# Patient Record
Sex: FEMALE | Race: ASIAN | NOT HISPANIC OR LATINO | URBAN - METROPOLITAN AREA
[De-identification: names, ages, dates, MRNs, and addresses within clinical notes are randomized per-mention and may not be internally consistent; named-entity substitution may affect disease eponyms.]

---

## 2019-03-23 ENCOUNTER — EMERGENCY (EMERGENCY)
Facility: HOSPITAL | Age: 24
LOS: 1 days | Discharge: HOME | End: 2019-03-23
Admitting: EMERGENCY MEDICINE
Payer: COMMERCIAL

## 2019-03-23 VITALS
TEMPERATURE: 99 F | RESPIRATION RATE: 16 BRPM | HEART RATE: 120 BPM | OXYGEN SATURATION: 100 % | SYSTOLIC BLOOD PRESSURE: 150 MMHG | DIASTOLIC BLOOD PRESSURE: 84 MMHG

## 2019-03-23 VITALS
DIASTOLIC BLOOD PRESSURE: 62 MMHG | SYSTOLIC BLOOD PRESSURE: 127 MMHG | HEART RATE: 113 BPM | TEMPERATURE: 99 F | OXYGEN SATURATION: 100 % | RESPIRATION RATE: 18 BRPM

## 2019-03-23 DIAGNOSIS — Z98.890 OTHER SPECIFIED POSTPROCEDURAL STATES: Chronic | ICD-10-CM

## 2019-03-23 LAB
ALBUMIN SERPL ELPH-MCNC: 4.6 G/DL — SIGNIFICANT CHANGE UP (ref 3.3–5)
ALP SERPL-CCNC: 68 U/L — SIGNIFICANT CHANGE UP (ref 40–120)
ALT FLD-CCNC: 21 U/L — SIGNIFICANT CHANGE UP (ref 4–33)
ANION GAP SERPL CALC-SCNC: 14 MMO/L — SIGNIFICANT CHANGE UP (ref 7–14)
AST SERPL-CCNC: 25 U/L — SIGNIFICANT CHANGE UP (ref 4–32)
BASE EXCESS BLDV CALC-SCNC: -0.8 MMOL/L — SIGNIFICANT CHANGE UP
BASOPHILS # BLD AUTO: 0.04 K/UL — SIGNIFICANT CHANGE UP (ref 0–0.2)
BASOPHILS NFR BLD AUTO: 0.5 % — SIGNIFICANT CHANGE UP (ref 0–2)
BILIRUB SERPL-MCNC: 0.4 MG/DL — SIGNIFICANT CHANGE UP (ref 0.2–1.2)
BLOOD GAS VENOUS - CREATININE: 0.54 MG/DL — SIGNIFICANT CHANGE UP (ref 0.5–1.3)
BUN SERPL-MCNC: 8 MG/DL — SIGNIFICANT CHANGE UP (ref 7–23)
CALCIUM SERPL-MCNC: 9.7 MG/DL — SIGNIFICANT CHANGE UP (ref 8.4–10.5)
CHLORIDE BLDV-SCNC: 108 MMOL/L — SIGNIFICANT CHANGE UP (ref 96–108)
CHLORIDE SERPL-SCNC: 105 MMOL/L — SIGNIFICANT CHANGE UP (ref 98–107)
CO2 SERPL-SCNC: 20 MMOL/L — LOW (ref 22–31)
CREAT SERPL-MCNC: 0.68 MG/DL — SIGNIFICANT CHANGE UP (ref 0.5–1.3)
EOSINOPHIL # BLD AUTO: 0.2 K/UL — SIGNIFICANT CHANGE UP (ref 0–0.5)
EOSINOPHIL NFR BLD AUTO: 2.7 % — SIGNIFICANT CHANGE UP (ref 0–6)
GAS PNL BLDV: 138 MMOL/L — SIGNIFICANT CHANGE UP (ref 136–146)
GLUCOSE BLDV-MCNC: 91 — SIGNIFICANT CHANGE UP (ref 70–99)
GLUCOSE SERPL-MCNC: 83 MG/DL — SIGNIFICANT CHANGE UP (ref 70–99)
HCO3 BLDV-SCNC: 23 MMOL/L — SIGNIFICANT CHANGE UP (ref 20–27)
HCT VFR BLD CALC: 43.4 % — SIGNIFICANT CHANGE UP (ref 34.5–45)
HCT VFR BLDV CALC: 43.7 % — SIGNIFICANT CHANGE UP (ref 34.5–45)
HGB BLD-MCNC: 14.3 G/DL — SIGNIFICANT CHANGE UP (ref 11.5–15.5)
HGB BLDV-MCNC: 14.3 G/DL — SIGNIFICANT CHANGE UP (ref 11.5–15.5)
IMM GRANULOCYTES NFR BLD AUTO: 0.4 % — SIGNIFICANT CHANGE UP (ref 0–1.5)
LACTATE BLDV-MCNC: 1.6 MMOL/L — SIGNIFICANT CHANGE UP (ref 0.5–2)
LYMPHOCYTES # BLD AUTO: 0.8 K/UL — LOW (ref 1–3.3)
LYMPHOCYTES # BLD AUTO: 10.8 % — LOW (ref 13–44)
MCHC RBC-ENTMCNC: 29.7 PG — SIGNIFICANT CHANGE UP (ref 27–34)
MCHC RBC-ENTMCNC: 32.9 % — SIGNIFICANT CHANGE UP (ref 32–36)
MCV RBC AUTO: 90.2 FL — SIGNIFICANT CHANGE UP (ref 80–100)
MONOCYTES # BLD AUTO: 0.55 K/UL — SIGNIFICANT CHANGE UP (ref 0–0.9)
MONOCYTES NFR BLD AUTO: 7.4 % — SIGNIFICANT CHANGE UP (ref 2–14)
NEUTROPHILS # BLD AUTO: 5.78 K/UL — SIGNIFICANT CHANGE UP (ref 1.8–7.4)
NEUTROPHILS NFR BLD AUTO: 78.2 % — HIGH (ref 43–77)
NRBC # FLD: 0 K/UL — LOW (ref 25–125)
PCO2 BLDV: 39 MMHG — LOW (ref 41–51)
PH BLDV: 7.4 PH — SIGNIFICANT CHANGE UP (ref 7.32–7.43)
PLATELET # BLD AUTO: 292 K/UL — SIGNIFICANT CHANGE UP (ref 150–400)
PMV BLD: 11 FL — SIGNIFICANT CHANGE UP (ref 7–13)
PO2 BLDV: 45 MMHG — HIGH (ref 35–40)
POTASSIUM BLDV-SCNC: 4.5 MMOL/L — SIGNIFICANT CHANGE UP (ref 3.4–4.5)
POTASSIUM SERPL-MCNC: 4.1 MMOL/L — SIGNIFICANT CHANGE UP (ref 3.5–5.3)
POTASSIUM SERPL-SCNC: 4.1 MMOL/L — SIGNIFICANT CHANGE UP (ref 3.5–5.3)
PROT SERPL-MCNC: 7.8 G/DL — SIGNIFICANT CHANGE UP (ref 6–8.3)
RBC # BLD: 4.81 M/UL — SIGNIFICANT CHANGE UP (ref 3.8–5.2)
RBC # FLD: 12 % — SIGNIFICANT CHANGE UP (ref 10.3–14.5)
SAO2 % BLDV: 79.8 % — SIGNIFICANT CHANGE UP (ref 60–85)
SODIUM SERPL-SCNC: 139 MMOL/L — SIGNIFICANT CHANGE UP (ref 135–145)
TROPONIN T, HIGH SENSITIVITY: < 6 NG/L — SIGNIFICANT CHANGE UP (ref ?–14)
WBC # BLD: 7.4 K/UL — SIGNIFICANT CHANGE UP (ref 3.8–10.5)
WBC # FLD AUTO: 7.4 K/UL — SIGNIFICANT CHANGE UP (ref 3.8–10.5)

## 2019-03-23 PROCEDURE — 71046 X-RAY EXAM CHEST 2 VIEWS: CPT | Mod: 26

## 2019-03-23 PROCEDURE — 99284 EMERGENCY DEPT VISIT MOD MDM: CPT | Mod: 25

## 2019-03-23 PROCEDURE — 71275 CT ANGIOGRAPHY CHEST: CPT | Mod: 26

## 2019-03-23 PROCEDURE — 93010 ELECTROCARDIOGRAM REPORT: CPT

## 2019-03-23 RX ORDER — MAGNESIUM SULFATE 500 MG/ML
2 VIAL (ML) INJECTION ONCE
Qty: 0 | Refills: 0 | Status: COMPLETED | OUTPATIENT
Start: 2019-03-23 | End: 2019-03-23

## 2019-03-23 RX ORDER — KETOROLAC TROMETHAMINE 30 MG/ML
30 SYRINGE (ML) INJECTION ONCE
Qty: 0 | Refills: 0 | Status: DISCONTINUED | OUTPATIENT
Start: 2019-03-23 | End: 2019-03-23

## 2019-03-23 RX ORDER — ACETAMINOPHEN 500 MG
650 TABLET ORAL ONCE
Qty: 0 | Refills: 0 | Status: COMPLETED | OUTPATIENT
Start: 2019-03-23 | End: 2019-03-23

## 2019-03-23 RX ORDER — ACETAMINOPHEN 500 MG
975 TABLET ORAL ONCE
Qty: 0 | Refills: 0 | Status: DISCONTINUED | OUTPATIENT
Start: 2019-03-23 | End: 2019-03-23

## 2019-03-23 RX ORDER — IPRATROPIUM/ALBUTEROL SULFATE 18-103MCG
3 AEROSOL WITH ADAPTER (GRAM) INHALATION ONCE
Qty: 0 | Refills: 0 | Status: COMPLETED | OUTPATIENT
Start: 2019-03-23 | End: 2019-03-23

## 2019-03-23 RX ORDER — SODIUM CHLORIDE 9 MG/ML
2000 INJECTION INTRAMUSCULAR; INTRAVENOUS; SUBCUTANEOUS ONCE
Qty: 0 | Refills: 0 | Status: COMPLETED | OUTPATIENT
Start: 2019-03-23 | End: 2019-03-23

## 2019-03-23 RX ORDER — ALBUTEROL 90 UG/1
2 AEROSOL, METERED ORAL
Qty: 1 | Refills: 0
Start: 2019-03-23 | End: 2019-04-01

## 2019-03-23 RX ADMIN — Medication 3 MILLILITER(S): at 17:44

## 2019-03-23 RX ADMIN — Medication 3 MILLILITER(S): at 18:05

## 2019-03-23 RX ADMIN — Medication 50 GRAM(S): at 21:50

## 2019-03-23 RX ADMIN — SODIUM CHLORIDE 2000 MILLILITER(S): 9 INJECTION INTRAMUSCULAR; INTRAVENOUS; SUBCUTANEOUS at 17:35

## 2019-03-23 RX ADMIN — Medication 650 MILLIGRAM(S): at 17:34

## 2019-03-23 RX ADMIN — Medication 125 MILLIGRAM(S): at 21:50

## 2019-03-23 RX ADMIN — Medication 30 MILLIGRAM(S): at 17:34

## 2019-03-23 NOTE — ED ADULT TRIAGE NOTE - CHIEF COMPLAINT QUOTE
Patient c/o a cough since yesterday, feels sob.  "Tightness in chest every time I try to talk or breath" Tachy cardiac in triage, pain in her chest when she coughs.

## 2019-03-23 NOTE — ED ADULT NURSE NOTE - OBJECTIVE STATEMENT
Received pt. in room 2 alert and oriented x 4, presenting to the ER complaining of a cough. Pt. verbalized that she has no medical history. Pt. stated " I started coughing since yesterday and I am bringing up some greenish color mucus, every time I cough my chest hurts". Medicated as ordered labs sent, will continue to monitor.

## 2019-03-23 NOTE — ED PROVIDER NOTE - BREATH SOUNDS
Mild crackles primarily L base. Pt speaking in full sentences with mild associates SOB, no Stridor, tripod posture. accessory muscle use or cyanosis.

## 2019-03-23 NOTE — ED PROVIDER NOTE - OBJECTIVE STATEMENT
24 Y/O F no PMH C/O 2 days of non-productive cough 22 Y/O F no PMH C/O 2 days of cough with associated with SOB sharp CP and chest tightness. She states her co-worker was recently diagnosed with pneumonia, pt also admits to fever and intermittent chills. She denies any recent travel, leg swelling, surgery or birth control. She denies H/O lung issues,denies ABD PN N V D Dizz or any other acute symptoms or complaints.

## 2019-03-23 NOTE — ED PROVIDER NOTE - CARE PLAN
Principal Discharge DX:	Pneumonia Principal Discharge DX:	Pneumonia  Secondary Diagnosis:	Lung nodule

## 2019-03-23 NOTE — ED PROVIDER NOTE - NSFOLLOWUPINSTRUCTIONS_ED_ALL_ED_FT
Rest, drink plenty of fluids.  Advance activity as tolerated.  Continue all previously prescribed medications as directed.  Follow up with your primary care physician in 48-72 hours- bring copies of your results.  Return to the ER for worsening or persistent symptoms, and/or ANY NEW OR CONCERNING SYMPTOMS. If you have issues obtaining follow up, please call: [phone] to obtain a doctor or specialist who takes your insurance in your area.  You may call [phone] to make an appointment with the internal medicine clinic. Follow up with your primary doctor. Levaquin which is an antibiotic and prednisone which is a steroid to help with breathing have been sent to your pharmacy. Take these medications daily and use your albuterol inhaler as needed every 4-6 hours. Return to the ER for any fever chills nightsweats shortness of breath or any other new, worsening or persistent symptoms.  Advance activity as tolerated.  Continue all previously prescribed medications as directed.  Follow up with your primary care physician in 48-72 hours- bring copies of your results.  Return to the ER for worsening or persistent symptoms, and/or ANY NEW OR CONCERNING SYMPTOMS. If you have issues obtaining follow up, please call: [phone] to obtain a doctor or specialist who takes your insurance in your area.  You may call [phone] to make an appointment with the internal medicine clinic. Follow up with your primary doctor, you have a small lung nodule, discuss this with your doctor. You may need to have a repeat CT scan or see a pulmonologist. Call  if you need to see  pulmonologist and follow up with a cardiologist as you had chest pain and you have a somewhat abnormal EKG. You can also lucas  for a cardiologist appointment. Levaquin which is an antibiotic and prednisone which is a steroid to help with breathing have been sent to your pharmacy. Take these medications daily and use your albuterol inhaler as needed every 4-6 hours. Return to the ER for any fever chills nightsweats shortness of breath or any other new, worsening or persistent symptoms.  Advance activity as tolerated.  Continue all previously prescribed medications as directed.  Follow up with your primary care physician in 48-72 hours- bring copies of your results.  Return to the ER for worsening or persistent symptoms, and/or ANY NEW OR CONCERNING SYMPTOMS. If you have issues obtaining follow up, please call: [phone] to obtain a doctor or specialist who takes your insurance in your area.  You may call [phone] to make an appointment with the internal medicine clinic.

## 2019-03-23 NOTE — ED PROVIDER NOTE - PROGRESS NOTE DETAILS
RAMÍREZ Boyle; Pt states she fells much better, is no longer SOB, clinically pneumonia. Wheezing significantly reduced and pt able to speak in full sentences without any difficulty. Pt received first dose of levaquin in ED, will send to pharmacy in addition to prednisone and an inhaler. Pt has a + sick contact for pneumonia which is her co-worker. RAMÍREZ Boyle; Pt states she fells much better, is no longer SOB, clinically pneumonia. Wheezing significantly reduced and pt able to speak in full sentences without any difficulty. Pt received first dose of levaquin in ED, will send to pharmacy in addition to prednisone and an inhaler. Pt has a + sick contact for pneumonia which is her co-worker. HR normalized, now slightly tachy, pt received multiple nebs and steroids.

## 2019-03-23 NOTE — ED PROVIDER NOTE - CLINICAL SUMMARY MEDICAL DECISION MAKING FREE TEXT BOX
23 Y/O F no PMH with a skick contact with pneumonia presents with cough with associated cp, sob and occasional chills. Likely pneumonia, will give fluids, supportive tx and reassess. X ray ordered to eval and CT A ordered to R/O PE and to check for focal consolidations. Pt is stable at this time, no acute respiratory distress.

## 2019-03-25 PROBLEM — Z78.9 OTHER SPECIFIED HEALTH STATUS: Chronic | Status: ACTIVE | Noted: 2019-03-23

## 2019-03-26 PROBLEM — Z00.00 ENCOUNTER FOR PREVENTIVE HEALTH EXAMINATION: Status: ACTIVE | Noted: 2019-03-26

## 2019-04-02 ENCOUNTER — APPOINTMENT (OUTPATIENT)
Dept: PULMONOLOGY | Facility: CLINIC | Age: 24
End: 2019-04-02
Payer: COMMERCIAL

## 2019-04-02 VITALS
DIASTOLIC BLOOD PRESSURE: 73 MMHG | OXYGEN SATURATION: 100 % | SYSTOLIC BLOOD PRESSURE: 123 MMHG | BODY MASS INDEX: 35.68 KG/M2 | HEART RATE: 90 BPM | WEIGHT: 209 LBS | HEIGHT: 64 IN

## 2019-04-02 DIAGNOSIS — Z82.3 FAMILY HISTORY OF STROKE: ICD-10-CM

## 2019-04-02 PROCEDURE — 94060 EVALUATION OF WHEEZING: CPT | Mod: 26

## 2019-04-02 PROCEDURE — 94729 DIFFUSING CAPACITY: CPT | Mod: 26

## 2019-04-02 PROCEDURE — 99203 OFFICE O/P NEW LOW 30 MIN: CPT | Mod: 25

## 2019-04-02 PROCEDURE — 94727 GAS DIL/WSHOT DETER LNG VOL: CPT | Mod: 26

## 2019-04-02 NOTE — HISTORY OF PRESENT ILLNESS
[FreeTextEntry1] : Patient is a 23-year-old lady with history of recent hospitalization for "pneumonia". The patient was treated with Levaquin and prednisone. She was admitted on March 23 with left upper anterior chest pain. At that time which a CT was done. Chest CT revealed no pneumonia. There was no PE. The patient had one subcentimeter 4 mm lung nodule.\par Patient is a nonsmoker. There is no history of exposure to passive smoking.\par Patient was exercising normally and doing well prior to this episode.\par She works as a  in a [employer].\par The patient had mild chest discomfort prior to hospitalization which is completely resolved.\par The patient currently is on 10 mg of prednisone.

## 2019-04-02 NOTE — PHYSICAL EXAM
[General Appearance - Well Developed] : well developed [Normal Appearance] : normal appearance [Well Groomed] : well groomed [General Appearance - Well Nourished] : well nourished [No Deformities] : no deformities [General Appearance - In No Acute Distress] : no acute distress [Normal Conjunctiva] : the conjunctiva exhibited no abnormalities [Eyelids - No Xanthelasma] : the eyelids demonstrated no xanthelasmas [Normal Oropharynx] : normal oropharynx [Neck Appearance] : the appearance of the neck was normal [Neck Cervical Mass (___cm)] : no neck mass was observed [Jugular Venous Distention Increased] : there was no jugular-venous distention [Thyroid Diffuse Enlargement] : the thyroid was not enlarged [Thyroid Nodule] : there were no palpable thyroid nodules [Heart Rate And Rhythm] : heart rate and rhythm were normal [Heart Sounds] : normal S1 and S2 [Murmurs] : no murmurs present [Abdomen Soft] : soft [Abdomen Tenderness] : non-tender [Abdomen Mass (___ Cm)] : no abdominal mass palpated [Abnormal Walk] : normal gait [Gait - Sufficient For Exercise Testing] : the gait was sufficient for exercise testing [Nail Clubbing] : no clubbing of the fingernails [Cyanosis, Localized] : no localized cyanosis [Petechial Hemorrhages (___cm)] : no petechial hemorrhages [Skin Color & Pigmentation] : normal skin color and pigmentation [Skin Turgor] : normal skin turgor [Deep Tendon Reflexes (DTR)] : deep tendon reflexes were 2+ and symmetric [Sensation] : the sensory exam was normal to light touch and pinprick [No Focal Deficits] : no focal deficits [Oriented To Time, Place, And Person] : oriented to person, place, and time [Impaired Insight] : insight and judgment were intact [Affect] : the affect was normal [] : no respiratory distress [Respiration, Rhythm And Depth] : normal respiratory rhythm and effort [Exaggerated Use Of Accessory Muscles For Inspiration] : no accessory muscle use [Auscultation Breath Sounds / Voice Sounds] : lungs were clear to auscultation bilaterally [FreeTextEntry1] : Acanthosis nigricans,

## 2019-04-02 NOTE — DISCUSSION/SUMMARY
[FreeTextEntry1] : The patient has no clear-cut evidence of pneumonia. The etiology of her chest pain prior to hospitalization is not clear. The CT chest was nondiagnostic.\par The patient currently is doing well. She was advised to stop prednisone.\par The patient has clinical features of hyperinsulinemia. She was advised to cut down the carbohydrate intake. She was advised to increase exercise.\par She was advised to take foundational functional supplements.

## 2019-05-06 ENCOUNTER — APPOINTMENT (OUTPATIENT)
Dept: PULMONOLOGY | Facility: CLINIC | Age: 24
End: 2019-05-06

## 2019-09-25 ENCOUNTER — EMERGENCY (EMERGENCY)
Facility: HOSPITAL | Age: 24
LOS: 1 days | Discharge: HOME | End: 2019-09-25
Admitting: EMERGENCY MEDICINE
Payer: COMMERCIAL

## 2019-09-25 VITALS
SYSTOLIC BLOOD PRESSURE: 148 MMHG | RESPIRATION RATE: 16 BRPM | HEART RATE: 96 BPM | OXYGEN SATURATION: 100 % | DIASTOLIC BLOOD PRESSURE: 86 MMHG | TEMPERATURE: 99 F

## 2019-09-25 DIAGNOSIS — Z98.890 OTHER SPECIFIED POSTPROCEDURAL STATES: Chronic | ICD-10-CM

## 2019-09-25 LAB
BASOPHILS # BLD AUTO: 0.06 K/UL — SIGNIFICANT CHANGE UP (ref 0–0.2)
BASOPHILS NFR BLD AUTO: 0.5 % — SIGNIFICANT CHANGE UP (ref 0–2)
EOSINOPHIL # BLD AUTO: 0.24 K/UL — SIGNIFICANT CHANGE UP (ref 0–0.5)
EOSINOPHIL NFR BLD AUTO: 2 % — SIGNIFICANT CHANGE UP (ref 0–6)
HCT VFR BLD CALC: 43.9 % — SIGNIFICANT CHANGE UP (ref 34.5–45)
HGB BLD-MCNC: 14.3 G/DL — SIGNIFICANT CHANGE UP (ref 11.5–15.5)
IMM GRANULOCYTES NFR BLD AUTO: 0.3 % — SIGNIFICANT CHANGE UP (ref 0–1.5)
LYMPHOCYTES # BLD AUTO: 19.6 % — SIGNIFICANT CHANGE UP (ref 13–44)
LYMPHOCYTES # BLD AUTO: 2.33 K/UL — SIGNIFICANT CHANGE UP (ref 1–3.3)
MCHC RBC-ENTMCNC: 29.5 PG — SIGNIFICANT CHANGE UP (ref 27–34)
MCHC RBC-ENTMCNC: 32.6 % — SIGNIFICANT CHANGE UP (ref 32–36)
MCV RBC AUTO: 90.5 FL — SIGNIFICANT CHANGE UP (ref 80–100)
MONOCYTES # BLD AUTO: 0.69 K/UL — SIGNIFICANT CHANGE UP (ref 0–0.9)
MONOCYTES NFR BLD AUTO: 5.8 % — SIGNIFICANT CHANGE UP (ref 2–14)
NEUTROPHILS # BLD AUTO: 8.51 K/UL — HIGH (ref 1.8–7.4)
NEUTROPHILS NFR BLD AUTO: 71.8 % — SIGNIFICANT CHANGE UP (ref 43–77)
NRBC # FLD: 0 K/UL — SIGNIFICANT CHANGE UP (ref 0–0)
PLATELET # BLD AUTO: 358 K/UL — SIGNIFICANT CHANGE UP (ref 150–400)
PMV BLD: 10.5 FL — SIGNIFICANT CHANGE UP (ref 7–13)
RBC # BLD: 4.85 M/UL — SIGNIFICANT CHANGE UP (ref 3.8–5.2)
RBC # FLD: 12.4 % — SIGNIFICANT CHANGE UP (ref 10.3–14.5)
WBC # BLD: 11.87 K/UL — HIGH (ref 3.8–10.5)
WBC # FLD AUTO: 11.87 K/UL — HIGH (ref 3.8–10.5)

## 2019-09-25 PROCEDURE — 99284 EMERGENCY DEPT VISIT MOD MDM: CPT

## 2019-09-25 NOTE — ED ADULT NURSE NOTE - OBJECTIVE STATEMENT
23 yo F AAOx4 received to intake 7 c/o abd cramping, pt ~ 1 month pregnant, LMP 8/25/19, denies any bleeding/clots/hematuria, denies any GI  changes, appears in NAD, 20G placed to LAC, labs sent, pt awaiting US at this time, will monitor

## 2019-09-25 NOTE — ED ADULT TRIAGE NOTE - CHIEF COMPLAINT QUOTE
Pt comes in for abd pain/cramping that began since last week, but just found out yesterday that she is pregnant. Pt LMP 8/25/19 and denies vaginal bleeding or discharge. Pt vs as noted and pt in no acute distress.

## 2019-09-26 DIAGNOSIS — O28.3 ABNORMAL ULTRASONIC FINDING ON ANTENATAL SCREENING OF MOTHER: ICD-10-CM

## 2019-09-26 LAB
ALBUMIN SERPL ELPH-MCNC: 4.9 G/DL — SIGNIFICANT CHANGE UP (ref 3.3–5)
ALP SERPL-CCNC: 63 U/L — SIGNIFICANT CHANGE UP (ref 40–120)
ALT FLD-CCNC: 25 U/L — SIGNIFICANT CHANGE UP (ref 4–33)
ANION GAP SERPL CALC-SCNC: 22 MMO/L — HIGH (ref 7–14)
AST SERPL-CCNC: 19 U/L — SIGNIFICANT CHANGE UP (ref 4–32)
BILIRUB SERPL-MCNC: 0.4 MG/DL — SIGNIFICANT CHANGE UP (ref 0.2–1.2)
BLD GP AB SCN SERPL QL: NEGATIVE — SIGNIFICANT CHANGE UP
BUN SERPL-MCNC: 7 MG/DL — SIGNIFICANT CHANGE UP (ref 7–23)
CALCIUM SERPL-MCNC: 9.8 MG/DL — SIGNIFICANT CHANGE UP (ref 8.4–10.5)
CHLORIDE SERPL-SCNC: 97 MMOL/L — LOW (ref 98–107)
CO2 SERPL-SCNC: 20 MMOL/L — LOW (ref 22–31)
CREAT SERPL-MCNC: 0.69 MG/DL — SIGNIFICANT CHANGE UP (ref 0.5–1.3)
GLUCOSE SERPL-MCNC: 92 MG/DL — SIGNIFICANT CHANGE UP (ref 70–99)
HCG SERPL-ACNC: 834.7 MIU/ML — SIGNIFICANT CHANGE UP
POTASSIUM SERPL-MCNC: 3.9 MMOL/L — SIGNIFICANT CHANGE UP (ref 3.5–5.3)
POTASSIUM SERPL-SCNC: 3.9 MMOL/L — SIGNIFICANT CHANGE UP (ref 3.5–5.3)
PROT SERPL-MCNC: 8.6 G/DL — HIGH (ref 6–8.3)
RH IG SCN BLD-IMP: POSITIVE — SIGNIFICANT CHANGE UP
SODIUM SERPL-SCNC: 139 MMOL/L — SIGNIFICANT CHANGE UP (ref 135–145)

## 2019-09-26 PROCEDURE — 76830 TRANSVAGINAL US NON-OB: CPT | Mod: 26

## 2019-09-26 NOTE — ED PROVIDER NOTE - CHPI ED SYMPTOMS NEG
no vomiting/no fever/no dysuria/no vaginal discharge/no nausea/diarrhea, weakness, numbness, tingling, headache, fevers/chills, flank pain, urinary symptoms

## 2019-09-26 NOTE — ED PROVIDER NOTE - OBJECTIVE STATEMENT
23 y/o  female with no known PMHx, LMP 2019 presents to ED c/o pelvic pain for 1 week. States she has had cramping since her last menstruation and thought the cramping was for her period this month however yesterday UCG at home was positive. No vaginal bleeding, urinary frequency or urgency, flank pain, n/v/d, weakness, numbness, tingling, headache or fevers/chills. Pt reports feeling dizzy. No other acute complaints at time of eval. 23 y/o  female with no known PMHx, LMP 2019 presents to ED c/o pelvic pain for 1 week. States she has had cramping since her last menstruation and thought the cramping was from her menses this month however yesterday UCG at home was positive. No vaginal bleeding, urinary frequency or urgency, flank pain, n/v/d, weakness, numbness, tingling sensation, headache or fevers/chills. Pt reports feeling dizzy. No other acute complaints at time of eval.

## 2019-09-26 NOTE — CONSULT NOTE ADULT - PROBLEM SELECTOR RECOMMENDATION 9
- Patient to follow up in 48 hours for repeat b-HCG with private GYN, given referral numbers for multiple providers, patient understand if she is not able to schedule an appointment she must return to the ED for repeat bHCG  - Ectopic precautions reviewed with patient.  Discussed with patient importance of follow up for b-HCG given unknown pregnancy location at this time.  Patient expressed understanding.  All questions and concerns addressed to patient's apparent satisfaction.   - Patient to be added to GYN b-HCG list for closer follow up.    - Patient stable for d/c home from GYN perspective.  Primary management per ED team.      CELSO Granado PGY2  d/w Dr. Pittman

## 2019-09-26 NOTE — CONSULT NOTE ADULT - ASSESSMENT
25 y/o , LMP 19, presenting to the ED with + home pregnancy test yesterday and pelvic cramping. VSS. Exams benign. Labs wnl. bHCG 834.7. TVUS without evidence of intrauterine or ectopic pregnancy. Differenital includes ectopic pregnancy vs. viable IUP vs. non-viable IUP.  Difficult to assess at this time.

## 2019-09-26 NOTE — ED PROVIDER NOTE - NSFOLLOWUPINSTRUCTIONS_ED_ALL_ED_FT
Please follow up in OB/GYN clinic in 2 days.  If you have any new, worsened, or concerning symptoms, please return to the emergency room immediately.

## 2019-09-26 NOTE — CONSULT NOTE ADULT - SUBJECTIVE AND OBJECTIVE BOX
Gyn Consult Note    MARGARET MOYER  24y  Female [id number]    HPI: 25 y/o , LMP 19, presenting to the ED with + home pregnancy test yesterday and pelvic cramping. Patient reports mild pelvic cramping x1 week. Reports some dizziness with the cramping but denies fevers, chills, nausea, vomiting, chest pain, shortness of breath, urinary symptoms, changes in bowel movements vaginal bleeding, purulent vaginal discharge. This is a desired pregnancy,    Name of GYN Physician: patient has never seen a GYN    OBHx: denies  GYNHx: Denies fibroids, cysts, endometriosis, STI's, Abnormal pap smears   PMH: denies  PSH: H/O left knee surgery x2  Meds: denies  All: NKDA  Soc: no substance use, anxiety/depression    accepts blood    Vital Signs Last 24 Hrs  T(C): 37 (25 Sep 2019 21:40), Max: 37 (25 Sep 2019 21:40)  T(F): 98.6 (25 Sep 2019 21:40), Max: 98.6 (25 Sep 2019 21:40)  HR: 96 (25 Sep 2019 21:40) (96 - 96)  BP: 148/86 (25 Sep 2019 21:40) (148/86 - 148/86)  BP(mean): --  RR: 16 (25 Sep 2019 21:40) (16 - 16)  SpO2: 100% (25 Sep 2019 21:40) (100% - 100%)    Physical Exam:   General: sitting comfortably in bed, NAD   CV: RRR  Lungs: CTAB  Abd: Soft, non-tender, non-distended.  :  No bleeding on pad.   External labia wnl.  Bimanual exam with no cervical motion tenderness, uterus wnl, adnexa non palpable b/l.  Cervix closed  Speculum Exam: No active bleeding from os. Physiologic discharge.    Ext: non-tender b/l, no edema     LABS:                              14.3   11.87 )-----------( 358      ( 25 Sep 2019 23:10 )             43.9         139  |  97<L>  |  7   ----------------------------<  92  3.9   |  20<L>  |  0.69    Ca    9.8      25 Sep 2019 23:10    TPro  8.6<H>  /  Alb  4.9  /  TBili  0.4  /  DBili  x   /  AST  19  /  ALT  25  /  AlkPhos  63      I&O's Detail      < from: US Transvaginal (19 @ 00:00) >    EXAM:  US TRANSVAGINAL        PROCEDURE DATE:  Sep 26 2019         INTERPRETATION:  CLINICAL INFORMATION: Pelvic pain. Pregnant.    LMP: 2019  HCG: Not available at time of interpretation.    COMPARISON: None available.    TECHNIQUE:     Endovaginal pelvic sonogram only.    FINDINGS:    Uterus: 6.3 x 3.5 x 5.3 cm.    Endometrium: 8 mm. Too small to characterize cystic focus. Otherwise, no   definite intrauterine gestational sac is visualized.    Right ovary: 3.4 x 1.4 x 1.1 cm. A corpus luteum measures 0.8 x 0.7 x 0.8   cm. Normal arterial and venous waveforms.    Left ovary: 3.1 x 1.5 x 1.7 cm. A left ovarian cyst measures 1.1 x 1.1 x   1.0 cm. Normal arterial and venous waveforms.    Mild free fluid in right adnexa identified.    IMPRESSION:    No sonographic evidence of ovarian torsion.    Too small to characterized cystic focus within endometrium. Otherwise, no   definite intrauterine gestational sac or fetal pole is identified.   Differential includes early pregnancy, missed  or ectopic   pregnancy. Correlate with serial beta-hCG and repeat pelvic ultrasound   can be performed for further evaluation if clinically indicated.    < end of copied text >

## 2019-09-26 NOTE — ED PROVIDER NOTE - PROGRESS NOTE DETAILS
RAMÍREZ Figueroa: TVUS - no definite IUP, beta hcg 834. Pt is seen by OB/GYN. Abdomen and pelvic exam non-acute, pt is hemodynamically stable. Pt to follow up in clinic in 2 days for serial beta hcg and repeat US.

## 2019-09-26 NOTE — ED PROVIDER NOTE - CLINICAL SUMMARY MEDICAL DECISION MAKING FREE TEXT BOX
25 y/o  female, LMP aug 25,2019,  presenting with pelvic pain and + UCG at home. Concern for spontaneous  vs ectopic pregnancy. Will get TV, ultrasound, labs, UA and reassess. 25 y/o  female, LMP aug 25,2019,  presenting with pelvic pain and +UCG at home. Concern for spontaneous  vs ectopic pregnancy. Will get TVUS, labs, UA and reassess. Consider GYN consult as applicable.

## 2019-09-26 NOTE — ED PROVIDER NOTE - PATIENT PORTAL LINK FT
You can access the FollowMyHealth Patient Portal offered by [facility] by registering at the following website: [email or web address]. By joining [email or web address]’s FollowMyHealth portal, you will also be able to view your health information using other applications (apps) compatible with our system.

## 2019-09-27 ENCOUNTER — EMERGENCY (EMERGENCY)
Facility: HOSPITAL | Age: 24
LOS: 1 days | Discharge: HOME | End: 2019-09-27
Admitting: EMERGENCY MEDICINE
Payer: COMMERCIAL

## 2019-09-27 VITALS
TEMPERATURE: 98 F | RESPIRATION RATE: 18 BRPM | OXYGEN SATURATION: 99 % | SYSTOLIC BLOOD PRESSURE: 145 MMHG | HEART RATE: 93 BPM | DIASTOLIC BLOOD PRESSURE: 63 MMHG

## 2019-09-27 DIAGNOSIS — O28.3 ABNORMAL ULTRASONIC FINDING ON ANTENATAL SCREENING OF MOTHER: ICD-10-CM

## 2019-09-27 DIAGNOSIS — Z98.890 OTHER SPECIFIED POSTPROCEDURAL STATES: Chronic | ICD-10-CM

## 2019-09-27 LAB
ALBUMIN SERPL ELPH-MCNC: 4.8 G/DL — SIGNIFICANT CHANGE UP (ref 3.3–5)
ALP SERPL-CCNC: 57 U/L — SIGNIFICANT CHANGE UP (ref 40–120)
ALT FLD-CCNC: 23 U/L — SIGNIFICANT CHANGE UP (ref 4–33)
ANION GAP SERPL CALC-SCNC: 12 MMO/L — SIGNIFICANT CHANGE UP (ref 7–14)
AST SERPL-CCNC: 21 U/L — SIGNIFICANT CHANGE UP (ref 4–32)
BASOPHILS # BLD AUTO: 0.04 K/UL — SIGNIFICANT CHANGE UP (ref 0–0.2)
BASOPHILS NFR BLD AUTO: 0.4 % — SIGNIFICANT CHANGE UP (ref 0–2)
BILIRUB SERPL-MCNC: 0.5 MG/DL — SIGNIFICANT CHANGE UP (ref 0.2–1.2)
BUN SERPL-MCNC: 8 MG/DL — SIGNIFICANT CHANGE UP (ref 7–23)
CALCIUM SERPL-MCNC: 9.7 MG/DL — SIGNIFICANT CHANGE UP (ref 8.4–10.5)
CHLORIDE SERPL-SCNC: 106 MMOL/L — SIGNIFICANT CHANGE UP (ref 98–107)
CO2 SERPL-SCNC: 22 MMOL/L — SIGNIFICANT CHANGE UP (ref 22–31)
CREAT SERPL-MCNC: 0.66 MG/DL — SIGNIFICANT CHANGE UP (ref 0.5–1.3)
EOSINOPHIL # BLD AUTO: 0.16 K/UL — SIGNIFICANT CHANGE UP (ref 0–0.5)
EOSINOPHIL NFR BLD AUTO: 1.6 % — SIGNIFICANT CHANGE UP (ref 0–6)
GLUCOSE SERPL-MCNC: 90 MG/DL — SIGNIFICANT CHANGE UP (ref 70–99)
HCG SERPL-ACNC: 1801 MIU/ML — SIGNIFICANT CHANGE UP
HCT VFR BLD CALC: 44.5 % — SIGNIFICANT CHANGE UP (ref 34.5–45)
HGB BLD-MCNC: 14.3 G/DL — SIGNIFICANT CHANGE UP (ref 11.5–15.5)
IMM GRANULOCYTES NFR BLD AUTO: 0.2 % — SIGNIFICANT CHANGE UP (ref 0–1.5)
LYMPHOCYTES # BLD AUTO: 1.93 K/UL — SIGNIFICANT CHANGE UP (ref 1–3.3)
LYMPHOCYTES # BLD AUTO: 19.5 % — SIGNIFICANT CHANGE UP (ref 13–44)
MCHC RBC-ENTMCNC: 29.2 PG — SIGNIFICANT CHANGE UP (ref 27–34)
MCHC RBC-ENTMCNC: 32.1 % — SIGNIFICANT CHANGE UP (ref 32–36)
MCV RBC AUTO: 90.8 FL — SIGNIFICANT CHANGE UP (ref 80–100)
MONOCYTES # BLD AUTO: 0.48 K/UL — SIGNIFICANT CHANGE UP (ref 0–0.9)
MONOCYTES NFR BLD AUTO: 4.8 % — SIGNIFICANT CHANGE UP (ref 2–14)
NEUTROPHILS # BLD AUTO: 7.29 K/UL — SIGNIFICANT CHANGE UP (ref 1.8–7.4)
NEUTROPHILS NFR BLD AUTO: 73.5 % — SIGNIFICANT CHANGE UP (ref 43–77)
NRBC # FLD: 0 K/UL — SIGNIFICANT CHANGE UP (ref 0–0)
PLATELET # BLD AUTO: 337 K/UL — SIGNIFICANT CHANGE UP (ref 150–400)
PMV BLD: 10.2 FL — SIGNIFICANT CHANGE UP (ref 7–13)
POTASSIUM SERPL-MCNC: 3.9 MMOL/L — SIGNIFICANT CHANGE UP (ref 3.5–5.3)
POTASSIUM SERPL-SCNC: 3.9 MMOL/L — SIGNIFICANT CHANGE UP (ref 3.5–5.3)
PROT SERPL-MCNC: 8.1 G/DL — SIGNIFICANT CHANGE UP (ref 6–8.3)
RBC # BLD: 4.9 M/UL — SIGNIFICANT CHANGE UP (ref 3.8–5.2)
RBC # FLD: 12.5 % — SIGNIFICANT CHANGE UP (ref 10.3–14.5)
SODIUM SERPL-SCNC: 140 MMOL/L — SIGNIFICANT CHANGE UP (ref 135–145)
WBC # BLD: 9.92 K/UL — SIGNIFICANT CHANGE UP (ref 3.8–10.5)
WBC # FLD AUTO: 9.92 K/UL — SIGNIFICANT CHANGE UP (ref 3.8–10.5)

## 2019-09-27 PROCEDURE — 76817 TRANSVAGINAL US OBSTETRIC: CPT | Mod: 26

## 2019-09-27 PROCEDURE — 99284 EMERGENCY DEPT VISIT MOD MDM: CPT

## 2019-09-27 NOTE — ED PROVIDER NOTE - CLINICAL SUMMARY MEDICAL DECISION MAKING FREE TEXT BOX
24 year old female G1Po, LMP 8/25/19 currently pregnant at nearly 4 weeks gestation presenting to ED for repeat hCG level. Pt was seen in the ED 2 days for abdominal pain without vaginal bleeding, at that time,  TVUS showed no definite IUP except for a small cystic focus in the endometrium too small to characterize, beta hcg 834. Pt was seen by OB/GYN and serial beta hcg level was recommended.   No acute complaints at this time; hemodynamically stable, well appearing, in no acute distress, non-acute abdomen. Plan for repeat beta hCG and consider OB/GYN consult for follow up plan. 24 year old female G1Po, LMP 8/25/19 currently pregnant at nearly 4 weeks gestation presenting to ED for repeat hCG level. Pt was seen in the ED 2 days for abdominal pain without vaginal bleeding, at that time,  TVUS showed no definite IUP except for a small cystic focus in the endometrium too small to characterize, beta hcg 834. Pt was seen by OB/GYN and serial beta hcg level was recommended.   No acute complaints at this time; hemodynamically stable, well appearing, in no acute distress, non-acute abdomen. Plan for repeat beta hCG and TVUS. Consider OB/GYN consult for follow up plan. 24 year old female , LMP 19 currently pregnant at nearly 4 weeks gestation presenting to ED for repeat hCG level. Pt was seen in the ED 2 days for abdominal pain without vaginal bleeding, at that time,  TVUS showed no definite IUP except for a small cystic focus in the endometrium too small to characterize, beta hcg 834. Pt was seen by OB/GYN and serial beta hcg level was recommended.   No acute complaints at this time; hemodynamically stable, well appearing, in no acute distress, non-acute abdomen. Plan for repeat beta hCG and TVUS given concern for pregnancy of unknown location, r/o ectopic. Consider OB/GYN consult for follow up plan.

## 2019-09-27 NOTE — CONSULT NOTE ADULT - ATTENDING COMMENTS
Pt was presented to me after patient left ER   Pt presented to the ER for follow up hcg  no complaints   hcg rising appropriately   ? yolk sac on ultrasound   precautions reviewed  repeat hcg and ultrasound in 48 hours    Kiara Orlando MD MSc

## 2019-09-27 NOTE — ED PROVIDER NOTE - PATIENT PORTAL LINK FT
You can access the FollowMyHealth Patient Portal offered by [facility] by registering at the following website: [email or web address]. By joining [email or web address]’s FollowMyHealth portal, you will also be able to view your health information using other applications (apps) compatible with our system.

## 2019-09-27 NOTE — CONSULT NOTE ADULT - ASSESSMENT
25yo P0 LMP 8/25/19 with pregnancy of unknown location presenting for follow up.  Today, bHCG is rising appropriately and sono significant for trilaminar sign and possible early gestational sac. Pt is hemodynamically stable without abdominal pain.

## 2019-09-27 NOTE — ED PROVIDER NOTE - PROGRESS NOTE DETAILS
RAMÍREZ Figueroa: TVUS today shows Early intrauterine gestation out of range for determination of gestational age by mean sac diameter. A small yolk sac is identified. No fetal pole is seen, likely beyond the limits of detection by ultrasound.  Pt remains asymptomatic, well appearing, stable for dc home with return precautions.  Seen by OB/GYN again today, plan for follow up with repeat TVUS and beta hCG level on Tuesday 10/1, if unable to get clinic appt, per OB pt to come to ED.

## 2019-09-27 NOTE — CONSULT NOTE ADULT - PROBLEM SELECTOR RECOMMENDATION 9
- likely early intrauterine pregnancy  - patient advised to present early next week, by 10/1 for repeat sono to confirm IUP. Pt will call Dr. Olguin's office on Monday 9/30 for possible script for sono.  - pt given precautions to return for eval if she has severe pain not relieved by Tylenol, heavy vaginal bleeding, or other acute concerns    Will d/w service attending  MARISSA Merchant, R4  [id number] - likely early intrauterine pregnancy  - patient advised to present early next week, by 10/1 for repeat sono to confirm IUP. Pt will call Dr. Olguin's office on Monday 9/30 for possible script for sono.  - pt given precautions to return for eval if she has severe pain not relieved by Tylenol, heavy vaginal bleeding, or other acute concerns    Will d/w service attending  SERGEY Sethi  [phone]      ##Addendum  Patient discussed and images reviewed with Dr. Orlando (OB Attending). As sono does not confirm intrauterine pregnancy at this time, recommendation is for patient to present in 48hrs  I called and spoke with patient regarding our recommendations. Patient plans to present to ED on Sunday 9/29 for repeat bHCG and ultrasound.    D/w Dr. Darion Merchant, R4

## 2019-09-27 NOTE — CONSULT NOTE ADULT - SUBJECTIVE AND OBJECTIVE BOX
23yo P0 LMP 8/25/19 being followed for pregnancy of unknown location presenting to ED today for repat bHCG and sono. Patient denies abdominal pain today, states that she occasionally has pelvic cramps that have significantly improved. Denies nausea or vomiting. No vaginal bleeding.  Patient scheduled OB appointment with Dr. Olguin for 10/14/19    [facility] [place] 834.7 (9/25[place]  B+    OBHx: denies  GYNHx: Denies fibroids, cysts, endometriosis, STI's, Abnormal pap smears   PMH: denies  PSH: H/O left knee surgery x2  Meds: denies  All: NKDA  Soc: no substance use, anxiety/depression    Vital Signs Last 24 Hrs  T(C): 36.7 (27 Sep 2019 11:26), Max: 36.7 (27 Sep 2019 11:26)  T(F): 98 (27 Sep 2019 11:26), Max: 98 (27 Sep 2019 11:26)  HR: 93 (27 Sep 2019 11:26) (93 - 93)  BP: 145/63 (27 Sep 2019 11:26) (145/63 - 145/63)  RR: 18 (27 Sep 2019 11:26) (18 - 18)  SpO2: 99% (27 Sep 2019 11:26) (99% - 99%)    Physical Exam  Gen: Comfortable, NAD  Abd: soft, nontender to palpation    LABS:                        14.3   9.92  )-----------( 337      ( 27 Sep 2019 14:26 )             44.5                         14.3   11.87 )-----------( 358      ( 25 Sep 2019 23:10 )             43.9     09-27    140  |  106  |  8   ----------------------------<  90  3.9   |  22  |  0.66    Ca    9.7      27 Sep 2019 14:26    TPro  8.1  /  Alb  4.8  /  TBili  0.5  /  DBili  x   /  AST  21  /  ALT  23  /  AlkPhos  57  09-27      RADIOLOGY & ADDITIONAL STUDIES:    < from: US Transvaginal, OB (09.27.19 @ 16:49) >  FINDINGS:    Uterus: Retroverted uterus demonstrating an early intrauterine gestation   with a mean sac diameter of 0.38 cm    Gestational Sac Size (mean): 0.38 cm    Crown Rump Length: n/a    Estimated Gestational Age: Out of range for determination of gestational   age by mean sac diameter.    Yolk Sac: Normal.    Fetal Heart Rate: N/A    Right ovary: 3.7 x 1.6 x 1.3 cm. Within normal limits.    Left ovary: 3.7 x 1.9 x 2.0 cm. Withinnormal limits. A corpus luteum   measures 1.5 cm.    Fluid: None.    IMPRESSION:    Early intrauterine gestation out of range for determination of   gestational age by mean sac diameter.    A small yolk sac is identified. No fetal pole is seen, likely beyond the   limits of detection by ultrasound.    < end of copied text >

## 2019-09-27 NOTE — ED PROVIDER NOTE - NSFOLLOWUPINSTRUCTIONS_ED_ALL_ED_FT
Please follow up on Tuesday 10/1/19 for a repeat OB ultrasound and beta hCG level; if you are unable to get an appointment in the office, please return to the ED.   If you have any new, worsened or concerning symptoms, please return to the emergency department immediately.

## 2019-09-27 NOTE — ED PROVIDER NOTE - OBJECTIVE STATEMENT
24 year old female G1Po, LMP 8/25/19 currently pregnant at nearly 4 weeks gestation presenting to ED for repeat hCG level. Pt was seen in the ED 2 days for abdominal pain without vaginal bleeding, at that time,  TVUS showed no definite IUP except for a small cystic focus in the endometrium too small to characterize, beta hcg 834. Pt was seen by OB/GYN and serial beta hcg level was recommended. Pt was follow up in OB/GYN clinic but states that she could not get an appointment and was told to come to ED. At this time, pt denies any abdominal pain, vaginal bleeding, shoulder pain, nausea, vomiting, fever, chills, or urinary symptoms. 24 year old female , LMP 19 currently pregnant at nearly 4 weeks gestation presenting to ED for repeat hCG level. Pt was seen in the ED 2 days for abdominal pain without vaginal bleeding, at that time,  TVUS showed no definite IUP except for a small cystic focus in the endometrium too small to characterize, beta hcg 834. Pt was seen by OB/GYN and serial beta hcg level was recommended. Pt was follow up in OB/GYN clinic but states that she could not get an appointment and was told to come to ED. At this time, pt denies any abdominal pain, vaginal bleeding, shoulder pain, nausea, vomiting, fever, chills, or urinary symptoms.

## 2019-09-29 ENCOUNTER — EMERGENCY (EMERGENCY)
Facility: HOSPITAL | Age: 24
LOS: 1 days | Discharge: HOME | End: 2019-09-29
Admitting: EMERGENCY MEDICINE
Payer: COMMERCIAL

## 2019-09-29 VITALS
TEMPERATURE: 98 F | DIASTOLIC BLOOD PRESSURE: 55 MMHG | RESPIRATION RATE: 18 BRPM | SYSTOLIC BLOOD PRESSURE: 139 MMHG | HEART RATE: 87 BPM | OXYGEN SATURATION: 100 %

## 2019-09-29 DIAGNOSIS — Z98.890 OTHER SPECIFIED POSTPROCEDURAL STATES: Chronic | ICD-10-CM

## 2019-09-29 PROCEDURE — 76817 TRANSVAGINAL US OBSTETRIC: CPT | Mod: 26

## 2019-09-29 PROCEDURE — 99283 EMERGENCY DEPT VISIT LOW MDM: CPT

## 2019-09-29 NOTE — ED PROVIDER NOTE - NSFOLLOWUPINSTRUCTIONS_ED_ALL_ED_FT
See your OBGYN at your scheduled appointment on 10/14 follow-up care. - bring a copy of your results with you to your appointment. Return to ER for new or worsening symptoms.

## 2019-09-29 NOTE — ED PROVIDER NOTE - OBJECTIVE STATEMENT
24 year old female  LMP 19 ~4.5 weeks gestation with no confirmed IUP returning today for repeat bHCG and TVUS. No acute complaints. Denies n/v/f/c, CP, SOB, abdominal pain, dysuria, hematuria, melena, hematochezia, diarrhea, constipation, vaginal bleeding/discharge/itching/odor. Patient scheduled OB appointment with Dr. Olguin for 10/14/19

## 2019-09-29 NOTE — ED PROVIDER NOTE - CLINICAL SUMMARY MEDICAL DECISION MAKING FREE TEXT BOX
24 year old female  LMP 19 ~4.5 weeks gestation with no confirmed IUP returning today for repeat bHCG and TVUS.   Plan: trend bhcg, tvus

## 2019-09-29 NOTE — ED PROVIDER NOTE - PATIENT PORTAL LINK FT
You can access the FollowMyHealth Patient Portal offered by [facility] by registering at the following website: [email or web address]. By joining [employer]’s FollowMyHealth portal, you will also be able to view your health information using other applications (apps) compatible with our system.

## 2019-09-30 VITALS
OXYGEN SATURATION: 100 % | HEART RATE: 78 BPM | TEMPERATURE: 98 F | RESPIRATION RATE: 18 BRPM | DIASTOLIC BLOOD PRESSURE: 65 MMHG | SYSTOLIC BLOOD PRESSURE: 120 MMHG

## 2019-09-30 LAB
ALBUMIN SERPL ELPH-MCNC: 4.7 G/DL — SIGNIFICANT CHANGE UP (ref 3.3–5)
ALP SERPL-CCNC: 57 U/L — SIGNIFICANT CHANGE UP (ref 40–120)
ALT FLD-CCNC: 24 U/L — SIGNIFICANT CHANGE UP (ref 4–33)
ANION GAP SERPL CALC-SCNC: 14 MMO/L — SIGNIFICANT CHANGE UP (ref 7–14)
AST SERPL-CCNC: 18 U/L — SIGNIFICANT CHANGE UP (ref 4–32)
BASOPHILS # BLD AUTO: 0.04 K/UL — SIGNIFICANT CHANGE UP (ref 0–0.2)
BASOPHILS NFR BLD AUTO: 0.3 % — SIGNIFICANT CHANGE UP (ref 0–2)
BILIRUB SERPL-MCNC: 0.3 MG/DL — SIGNIFICANT CHANGE UP (ref 0.2–1.2)
BUN SERPL-MCNC: 8 MG/DL — SIGNIFICANT CHANGE UP (ref 7–23)
CALCIUM SERPL-MCNC: 9.7 MG/DL — SIGNIFICANT CHANGE UP (ref 8.4–10.5)
CHLORIDE SERPL-SCNC: 105 MMOL/L — SIGNIFICANT CHANGE UP (ref 98–107)
CO2 SERPL-SCNC: 21 MMOL/L — LOW (ref 22–31)
CREAT SERPL-MCNC: 0.7 MG/DL — SIGNIFICANT CHANGE UP (ref 0.5–1.3)
EOSINOPHIL # BLD AUTO: 0.25 K/UL — SIGNIFICANT CHANGE UP (ref 0–0.5)
EOSINOPHIL NFR BLD AUTO: 2.1 % — SIGNIFICANT CHANGE UP (ref 0–6)
GLUCOSE SERPL-MCNC: 91 MG/DL — SIGNIFICANT CHANGE UP (ref 70–99)
HCG SERPL-ACNC: 4359 MIU/ML — SIGNIFICANT CHANGE UP
HCT VFR BLD CALC: 42.6 % — SIGNIFICANT CHANGE UP (ref 34.5–45)
HGB BLD-MCNC: 13.7 G/DL — SIGNIFICANT CHANGE UP (ref 11.5–15.5)
IMM GRANULOCYTES NFR BLD AUTO: 0.3 % — SIGNIFICANT CHANGE UP (ref 0–1.5)
LYMPHOCYTES # BLD AUTO: 2.48 K/UL — SIGNIFICANT CHANGE UP (ref 1–3.3)
LYMPHOCYTES # BLD AUTO: 21.1 % — SIGNIFICANT CHANGE UP (ref 13–44)
MCHC RBC-ENTMCNC: 29.2 PG — SIGNIFICANT CHANGE UP (ref 27–34)
MCHC RBC-ENTMCNC: 32.2 % — SIGNIFICANT CHANGE UP (ref 32–36)
MCV RBC AUTO: 90.8 FL — SIGNIFICANT CHANGE UP (ref 80–100)
MONOCYTES # BLD AUTO: 0.65 K/UL — SIGNIFICANT CHANGE UP (ref 0–0.9)
MONOCYTES NFR BLD AUTO: 5.5 % — SIGNIFICANT CHANGE UP (ref 2–14)
NEUTROPHILS # BLD AUTO: 8.32 K/UL — HIGH (ref 1.8–7.4)
NEUTROPHILS NFR BLD AUTO: 70.7 % — SIGNIFICANT CHANGE UP (ref 43–77)
NRBC # FLD: 0 K/UL — SIGNIFICANT CHANGE UP (ref 0–0)
PLATELET # BLD AUTO: 349 K/UL — SIGNIFICANT CHANGE UP (ref 150–400)
PMV BLD: 10.3 FL — SIGNIFICANT CHANGE UP (ref 7–13)
POTASSIUM SERPL-MCNC: 4.1 MMOL/L — SIGNIFICANT CHANGE UP (ref 3.5–5.3)
POTASSIUM SERPL-SCNC: 4.1 MMOL/L — SIGNIFICANT CHANGE UP (ref 3.5–5.3)
PROT SERPL-MCNC: 8.1 G/DL — SIGNIFICANT CHANGE UP (ref 6–8.3)
RBC # BLD: 4.69 M/UL — SIGNIFICANT CHANGE UP (ref 3.8–5.2)
RBC # FLD: 12.5 % — SIGNIFICANT CHANGE UP (ref 10.3–14.5)
SODIUM SERPL-SCNC: 140 MMOL/L — SIGNIFICANT CHANGE UP (ref 135–145)
WBC # BLD: 11.77 K/UL — HIGH (ref 3.8–10.5)
WBC # FLD AUTO: 11.77 K/UL — HIGH (ref 3.8–10.5)

## 2019-09-30 NOTE — ED ADULT NURSE NOTE - NSIMPLEMENTINTERV_GEN_ALL_ED
Implemented All Universal Safety Interventions:  [place] to call system. Call bell, personal items and telephone within reach. Instruct patient to call for assistance. Room bathroom lighting operational. Non-slip footwear when patient is off stretcher. Physically safe environment: no spills, clutter or unnecessary equipment. Stretcher in lowest position, wheels locked, appropriate side rails in place.

## 2019-09-30 NOTE — ED ADULT NURSE NOTE - OBJECTIVE STATEMENT
A&Ox3 presents with cramping intermittently since Wednesday, LMP 19 , no symptoms at this time, here to rule out ectopic. IV placed R arm 20G. No distress. No urinary changes or burning. No vaginal discharge or bleeding.

## 2019-10-15 ENCOUNTER — APPOINTMENT (OUTPATIENT)
Dept: OBGYN | Facility: CLINIC | Age: 24
End: 2019-10-15
Payer: COMMERCIAL

## 2019-10-15 ENCOUNTER — NON-APPOINTMENT (OUTPATIENT)
Age: 24
End: 2019-10-15

## 2019-10-15 VITALS
BODY MASS INDEX: 35.34 KG/M2 | HEIGHT: 64 IN | WEIGHT: 207 LBS | SYSTOLIC BLOOD PRESSURE: 120 MMHG | DIASTOLIC BLOOD PRESSURE: 60 MMHG

## 2019-10-15 LAB
BILIRUB UR QL STRIP: NORMAL
GLUCOSE UR-MCNC: NORMAL
HCG UR QL: 0.2 EU/DL
HCG UR QL: POSITIVE
HGB UR QL STRIP.AUTO: NORMAL
KETONES UR-MCNC: NORMAL
LEUKOCYTE ESTERASE UR QL STRIP: NORMAL
NITRITE UR QL STRIP: NORMAL
PH UR STRIP: 6.5
PROT UR STRIP-MCNC: NORMAL
SP GR UR STRIP: 1.02

## 2019-10-15 PROCEDURE — 0501F PRENATAL FLOW SHEET: CPT

## 2019-10-15 PROCEDURE — 81003 URINALYSIS AUTO W/O SCOPE: CPT | Mod: QW

## 2019-10-15 PROCEDURE — 81025 URINE PREGNANCY TEST: CPT

## 2019-10-15 RX ORDER — PREDNISONE 10 MG/1
10 TABLET ORAL
Qty: 22 | Refills: 0 | Status: COMPLETED | COMMUNITY
Start: 2019-03-28 | End: 2019-10-15

## 2019-10-17 LAB
BACTERIA UR CULT: NORMAL
C TRACH RRNA SPEC QL NAA+PROBE: NOT DETECTED
N GONORRHOEA RRNA SPEC QL NAA+PROBE: NOT DETECTED
SOURCE AMPLIFICATION: NORMAL

## 2019-10-21 LAB
CYTOLOGY CVX/VAG DOC THIN PREP: ABNORMAL
SOURCE AMPLIFICATION: NORMAL
T VAGINALIS RRNA SPEC QL NAA+PROBE: NOT DETECTED

## 2019-11-05 ENCOUNTER — EMERGENCY (EMERGENCY)
Facility: HOSPITAL | Age: 24
LOS: 1 days | Discharge: HOME | End: 2019-11-05
Attending: STUDENT IN AN ORGANIZED HEALTH CARE EDUCATION/TRAINING PROGRAM
Payer: COMMERCIAL

## 2019-11-05 VITALS
SYSTOLIC BLOOD PRESSURE: 128 MMHG | HEART RATE: 90 BPM | RESPIRATION RATE: 18 BRPM | TEMPERATURE: 98 F | OXYGEN SATURATION: 97 % | DIASTOLIC BLOOD PRESSURE: 67 MMHG

## 2019-11-05 DIAGNOSIS — Z98.890 OTHER SPECIFIED POSTPROCEDURAL STATES: Chronic | ICD-10-CM

## 2019-11-05 LAB
APPEARANCE UR: CLEAR — SIGNIFICANT CHANGE UP
BACTERIA # UR AUTO: NEGATIVE — SIGNIFICANT CHANGE UP
BILIRUB UR-MCNC: NEGATIVE — SIGNIFICANT CHANGE UP
BLOOD UR QL VISUAL: SIGNIFICANT CHANGE UP
COLOR SPEC: YELLOW — SIGNIFICANT CHANGE UP
GLUCOSE UR-MCNC: NEGATIVE — SIGNIFICANT CHANGE UP
HCT VFR BLD CALC: 44.5 % — SIGNIFICANT CHANGE UP (ref 34.5–45)
HGB BLD-MCNC: 14.2 G/DL — SIGNIFICANT CHANGE UP (ref 11.5–15.5)
HYALINE CASTS # UR AUTO: NEGATIVE — SIGNIFICANT CHANGE UP
KETONES UR-MCNC: HIGH
LEUKOCYTE ESTERASE UR-ACNC: NEGATIVE — SIGNIFICANT CHANGE UP
MCHC RBC-ENTMCNC: 28.7 PG — SIGNIFICANT CHANGE UP (ref 27–34)
MCHC RBC-ENTMCNC: 31.9 % — LOW (ref 32–36)
MCV RBC AUTO: 90.1 FL — SIGNIFICANT CHANGE UP (ref 80–100)
NITRITE UR-MCNC: NEGATIVE — SIGNIFICANT CHANGE UP
NRBC # FLD: 0 K/UL — SIGNIFICANT CHANGE UP (ref 0–0)
PH UR: 6 — SIGNIFICANT CHANGE UP (ref 5–8)
PLATELET # BLD AUTO: 342 K/UL — SIGNIFICANT CHANGE UP (ref 150–400)
PMV BLD: 10.7 FL — SIGNIFICANT CHANGE UP (ref 7–13)
PROT UR-MCNC: 10 — SIGNIFICANT CHANGE UP
RBC # BLD: 4.94 M/UL — SIGNIFICANT CHANGE UP (ref 3.8–5.2)
RBC # FLD: 12.5 % — SIGNIFICANT CHANGE UP (ref 10.3–14.5)
RBC CASTS # UR COMP ASSIST: HIGH (ref 0–?)
SP GR SPEC: 1.02 — SIGNIFICANT CHANGE UP (ref 1–1.04)
SQUAMOUS # UR AUTO: SIGNIFICANT CHANGE UP
UROBILINOGEN FLD QL: NORMAL — SIGNIFICANT CHANGE UP
WBC # BLD: 14.63 K/UL — HIGH (ref 3.8–10.5)
WBC # FLD AUTO: 14.63 K/UL — HIGH (ref 3.8–10.5)
WBC UR QL: SIGNIFICANT CHANGE UP (ref 0–?)

## 2019-11-05 PROCEDURE — 76817 TRANSVAGINAL US OBSTETRIC: CPT | Mod: 26

## 2019-11-05 PROCEDURE — 99284 EMERGENCY DEPT VISIT MOD MDM: CPT

## 2019-11-05 NOTE — ED ADULT TRIAGE NOTE - CHIEF COMPLAINT QUOTE
Pt c/o chest pain acc with dizziness x a few days. Pt states she is 10 weeks pregnant, c/o lower abdominal cramping. c/o vaginal spotting last evening.

## 2019-11-05 NOTE — ED PROVIDER NOTE - CLINICAL SUMMARY MEDICAL DECISION MAKING FREE TEXT BOX
25 y/o female with known IUP presenting with abdominal pain and vaginal spotting. Signs and symptoms are warrant for threatened . Plan for ultrasound, labs including titan screen and supportive management.

## 2019-11-05 NOTE — ED ADULT NURSE REASSESSMENT NOTE - NS ED NURSE REASSESS COMMENT FT1
A&XO4 amb self care 10 week pregnant female presents to the ed today for vaginal pain with bleeding x1 day. pt states she is feeling dizzy. labs drawn and sent. urine drawn and sent.

## 2019-11-05 NOTE — ED PROVIDER NOTE - OBJECTIVE STATEMENT
25 y/o reportedly 10 weeks pregnant female with no PMHx presents to ED with lower abdominal pain and intermittent vaginal spotting. Pt states she was in a normal state of health until 2 weeks prior to presentation when she started developing dizziness, lower abdominal cramping and intermittent vaginal spotting. Pt had confirmatory ultrasound to define intrauterine pregnancy prior to coming. However, today pt finds symptoms are increased and different from before. No other acute complaints at time of eval.

## 2019-11-06 VITALS
SYSTOLIC BLOOD PRESSURE: 128 MMHG | DIASTOLIC BLOOD PRESSURE: 75 MMHG | OXYGEN SATURATION: 100 % | TEMPERATURE: 98 F | RESPIRATION RATE: 20 BRPM | HEART RATE: 83 BPM

## 2019-11-06 LAB
ALBUMIN SERPL ELPH-MCNC: 4.4 G/DL — SIGNIFICANT CHANGE UP (ref 3.3–5)
ALP SERPL-CCNC: 49 U/L — SIGNIFICANT CHANGE UP (ref 40–120)
ALT FLD-CCNC: 24 U/L — SIGNIFICANT CHANGE UP (ref 4–33)
ANION GAP SERPL CALC-SCNC: 15 MMO/L — HIGH (ref 7–14)
AST SERPL-CCNC: 20 U/L — SIGNIFICANT CHANGE UP (ref 4–32)
BILIRUB SERPL-MCNC: 0.3 MG/DL — SIGNIFICANT CHANGE UP (ref 0.2–1.2)
BLD GP AB SCN SERPL QL: NEGATIVE — SIGNIFICANT CHANGE UP
BUN SERPL-MCNC: 8 MG/DL — SIGNIFICANT CHANGE UP (ref 7–23)
CALCIUM SERPL-MCNC: 9.6 MG/DL — SIGNIFICANT CHANGE UP (ref 8.4–10.5)
CHLORIDE SERPL-SCNC: 102 MMOL/L — SIGNIFICANT CHANGE UP (ref 98–107)
CO2 SERPL-SCNC: 19 MMOL/L — LOW (ref 22–31)
CREAT SERPL-MCNC: 0.57 MG/DL — SIGNIFICANT CHANGE UP (ref 0.5–1.3)
GLUCOSE SERPL-MCNC: 79 MG/DL — SIGNIFICANT CHANGE UP (ref 70–99)
HCG SERPL-ACNC: SIGNIFICANT CHANGE UP MIU/ML
POTASSIUM SERPL-MCNC: 4 MMOL/L — SIGNIFICANT CHANGE UP (ref 3.5–5.3)
POTASSIUM SERPL-SCNC: 4 MMOL/L — SIGNIFICANT CHANGE UP (ref 3.5–5.3)
PROT SERPL-MCNC: 7.7 G/DL — SIGNIFICANT CHANGE UP (ref 6–8.3)
RH IG SCN BLD-IMP: POSITIVE — SIGNIFICANT CHANGE UP
SODIUM SERPL-SCNC: 136 MMOL/L — SIGNIFICANT CHANGE UP (ref 135–145)

## 2019-11-06 RX ORDER — MORPHINE SULFATE 50 MG/1
4 CAPSULE, EXTENDED RELEASE ORAL ONCE
Refills: 0 | Status: DISCONTINUED | OUTPATIENT
Start: 2019-11-06 | End: 2019-11-06

## 2019-11-06 RX ORDER — ACETAMINOPHEN 500 MG
650 TABLET ORAL ONCE
Refills: 0 | Status: COMPLETED | OUTPATIENT
Start: 2019-11-06 | End: 2019-11-06

## 2019-11-06 RX ORDER — ONDANSETRON 8 MG/1
1 TABLET, FILM COATED ORAL
Qty: 20 | Refills: 0
Start: 2019-11-06 | End: 2019-11-15

## 2019-11-06 RX ADMIN — Medication 650 MILLIGRAM(S): at 00:29

## 2019-11-07 LAB
BACTERIA UR CULT: SIGNIFICANT CHANGE UP
SPECIMEN SOURCE: SIGNIFICANT CHANGE UP

## 2019-11-12 ENCOUNTER — NON-APPOINTMENT (OUTPATIENT)
Age: 24
End: 2019-11-12

## 2019-11-12 ENCOUNTER — EMERGENCY (EMERGENCY)
Facility: HOSPITAL | Age: 24
LOS: 1 days | Discharge: HOME | End: 2019-11-12
Attending: EMERGENCY MEDICINE | Admitting: EMERGENCY MEDICINE
Payer: COMMERCIAL

## 2019-11-12 ENCOUNTER — APPOINTMENT (OUTPATIENT)
Dept: OBGYN | Facility: CLINIC | Age: 24
End: 2019-11-12
Payer: COMMERCIAL

## 2019-11-12 VITALS
DIASTOLIC BLOOD PRESSURE: 66 MMHG | SYSTOLIC BLOOD PRESSURE: 110 MMHG | WEIGHT: 202 LBS | HEIGHT: 64 IN | BODY MASS INDEX: 34.49 KG/M2

## 2019-11-12 VITALS
OXYGEN SATURATION: 100 % | RESPIRATION RATE: 16 BRPM | HEART RATE: 86 BPM | SYSTOLIC BLOOD PRESSURE: 118 MMHG | TEMPERATURE: 98 F | DIASTOLIC BLOOD PRESSURE: 54 MMHG

## 2019-11-12 DIAGNOSIS — Z98.890 OTHER SPECIFIED POSTPROCEDURAL STATES: Chronic | ICD-10-CM

## 2019-11-12 PROCEDURE — 99285 EMERGENCY DEPT VISIT HI MDM: CPT | Mod: 25

## 2019-11-12 PROCEDURE — 0502F SUBSEQUENT PRENATAL CARE: CPT

## 2019-11-12 PROCEDURE — 93010 ELECTROCARDIOGRAM REPORT: CPT

## 2019-11-12 PROCEDURE — 36415 COLL VENOUS BLD VENIPUNCTURE: CPT

## 2019-11-12 NOTE — ED PROVIDER NOTE - PROGRESS NOTE DETAILS
Beto: had US here last week which showed viable IUP;  states with testing today everything was fine with fetus; that being said do not feel any indication to proceed with threatened ab workup; pt EKG NSR no block; will d/c for likely vagal episode in setting of blood draw

## 2019-11-12 NOTE — ED PROVIDER NOTE - PATIENT PORTAL LINK FT
You can access the FollowMyHealth Patient Portal offered by [facility] by registering at the following website: [email or web address]. By joining [employer]’s FollowMyHealth portal, you will also be able to view your health information using other applications (apps) compatible with our system.

## 2019-11-12 NOTE — ED PROVIDER NOTE - NSFOLLOWUPINSTRUCTIONS_ED_ALL_ED_FT
Continue following up with your OB doctor for pregnancy  Return to ED for any complaints in which you feel you need immediate care.

## 2019-11-12 NOTE — ED PROVIDER NOTE - CROS ED RESP ALL NEG
Alert-The patient is alert, awake and responds to voice. The patient is oriented to time, place, and person. The triage nurse is able to obtain subjective information. negative...

## 2019-11-12 NOTE — ED PROVIDER NOTE - OBJECTIVE STATEMENT
24 yr old  at 10 weeks by US presents with following hx.  Was at OB/GYN for prenatal testing and had US which showed viable IUP.  While drawing blood felt weak and had a pre syncopal episode prompting her to be sent to the ED for evaluation.  Per  did not eat much today.  She states one vagal episode in the past.  Currently denies CP, SOb.  Only complaint is mild headache.

## 2019-11-13 LAB
ABO + RH PNL BLD: NORMAL
BASOPHILS # BLD AUTO: 0.05 K/UL
BASOPHILS NFR BLD AUTO: 0.5 %
BILIRUB UR QL STRIP: NORMAL
BLD GP AB SCN SERPL QL: NORMAL
EOSINOPHIL # BLD AUTO: 0.17 K/UL
EOSINOPHIL NFR BLD AUTO: 1.5 %
GLUCOSE UR-MCNC: NORMAL
HBV SURFACE AG SERPL QL IA: NONREACTIVE
HCG UR QL: 0.2 EU/DL
HCT VFR BLD CALC: 44.3 %
HCV AB SER QL: NONREACTIVE
HCV S/CO RATIO: 0.12 S/CO
HGB BLD-MCNC: 14.8 G/DL
HGB UR QL STRIP.AUTO: NORMAL
HIV1+2 AB SPEC QL IA.RAPID: NONREACTIVE
IMM GRANULOCYTES NFR BLD AUTO: 0.4 %
KETONES UR-MCNC: NORMAL
LEUKOCYTE ESTERASE UR QL STRIP: NORMAL
LYMPHOCYTES # BLD AUTO: 1.94 K/UL
LYMPHOCYTES NFR BLD AUTO: 17.5 %
MAN DIFF?: NORMAL
MCHC RBC-ENTMCNC: 30 PG
MCHC RBC-ENTMCNC: 33.4 GM/DL
MCV RBC AUTO: 89.9 FL
MONOCYTES # BLD AUTO: 0.56 K/UL
MONOCYTES NFR BLD AUTO: 5 %
NEUTROPHILS # BLD AUTO: 8.34 K/UL
NEUTROPHILS NFR BLD AUTO: 75.1 %
NITRITE UR QL STRIP: NORMAL
PH UR STRIP: 6
PLATELET # BLD AUTO: 311 K/UL
PROT UR STRIP-MCNC: NORMAL
RBC # BLD: 4.93 M/UL
RBC # FLD: 12.7 %
SP GR UR STRIP: 1.02
T PALLIDUM AB SER QL IA: NEGATIVE
TSH SERPL-ACNC: 1.43 UIU/ML
WBC # FLD AUTO: 11.1 K/UL

## 2019-11-18 LAB
B19V IGG SER QL IA: 0.1 INDEX
B19V IGG+IGM SER-IMP: NEGATIVE
B19V IGG+IGM SER-IMP: NORMAL
B19V IGM FLD-ACNC: 0.2 INDEX
B19V IGM SER-ACNC: NEGATIVE
BACTERIA UR CULT: NORMAL
HGB A MFR BLD: 97.2 %
HGB A2 MFR BLD: 2.8 %
HGB FRACT BLD-IMP: NORMAL
LEAD BLD-MCNC: <1 UG/DL
MEV IGG FLD QL IA: 72 AU/ML
MEV IGG+IGM SER-IMP: POSITIVE
RUBV IGG FLD-ACNC: 3.3 INDEX
RUBV IGG SER-IMP: POSITIVE
VZV AB TITR SER: POSITIVE
VZV IGG SER IF-ACNC: 1090 INDEX

## 2019-12-12 NOTE — ED PROVIDER NOTE - CROS ED NEURO ALL NEG
Patient is in the supine position.   The body was positioned using the following devices: blanket and draw sheet.  Procedure performed on a bed/cart.The patient was positioned by Franck Murillo RN  - - -

## 2019-12-17 ENCOUNTER — NON-APPOINTMENT (OUTPATIENT)
Age: 24
End: 2019-12-17

## 2019-12-17 ENCOUNTER — APPOINTMENT (OUTPATIENT)
Dept: OBGYN | Facility: CLINIC | Age: 24
End: 2019-12-17
Payer: COMMERCIAL

## 2019-12-17 VITALS
SYSTOLIC BLOOD PRESSURE: 142 MMHG | HEIGHT: 64 IN | WEIGHT: 202 LBS | DIASTOLIC BLOOD PRESSURE: 60 MMHG | BODY MASS INDEX: 34.49 KG/M2

## 2019-12-17 VITALS — SYSTOLIC BLOOD PRESSURE: 122 MMHG | DIASTOLIC BLOOD PRESSURE: 64 MMHG

## 2019-12-17 PROCEDURE — 0502F SUBSEQUENT PRENATAL CARE: CPT

## 2019-12-17 PROCEDURE — 36415 COLL VENOUS BLD VENIPUNCTURE: CPT

## 2019-12-18 LAB
BILIRUB UR QL STRIP: NORMAL
GLUCOSE UR-MCNC: NORMAL
HCG UR QL: 0.2 EU/DL
HGB UR QL STRIP.AUTO: NORMAL
KETONES UR-MCNC: NORMAL
LEUKOCYTE ESTERASE UR QL STRIP: NORMAL
NITRITE UR QL STRIP: NORMAL
PH UR STRIP: 6
PROT UR STRIP-MCNC: NORMAL
SP GR UR STRIP: 1.02

## 2019-12-20 LAB
2ND TRIMESTER DATA: NORMAL
AFP PNL SERPL: NORMAL
AFP SERPL-ACNC: NORMAL
CLINICAL BIOCHEMIST REVIEW: NORMAL
NOTES NTD: NORMAL

## 2020-01-15 ENCOUNTER — NON-APPOINTMENT (OUTPATIENT)
Age: 25
End: 2020-01-15

## 2020-01-15 ENCOUNTER — ASOB RESULT (OUTPATIENT)
Age: 25
End: 2020-01-15

## 2020-01-15 ENCOUNTER — APPOINTMENT (OUTPATIENT)
Dept: OBGYN | Facility: CLINIC | Age: 25
End: 2020-01-15
Payer: COMMERCIAL

## 2020-01-15 ENCOUNTER — APPOINTMENT (OUTPATIENT)
Dept: ANTEPARTUM | Facility: CLINIC | Age: 25
End: 2020-01-15
Payer: COMMERCIAL

## 2020-01-15 VITALS — SYSTOLIC BLOOD PRESSURE: 138 MMHG | DIASTOLIC BLOOD PRESSURE: 64 MMHG

## 2020-01-15 VITALS
HEIGHT: 64 IN | DIASTOLIC BLOOD PRESSURE: 70 MMHG | SYSTOLIC BLOOD PRESSURE: 150 MMHG | BODY MASS INDEX: 36.02 KG/M2 | WEIGHT: 211 LBS

## 2020-01-15 PROCEDURE — 0502F SUBSEQUENT PRENATAL CARE: CPT

## 2020-01-15 PROCEDURE — 76811 OB US DETAILED SNGL FETUS: CPT

## 2020-01-15 PROCEDURE — 76817 TRANSVAGINAL US OBSTETRIC: CPT

## 2020-01-15 PROCEDURE — 36415 COLL VENOUS BLD VENIPUNCTURE: CPT

## 2020-01-16 LAB
ALBUMIN SERPL ELPH-MCNC: 4.2 G/DL
ALP BLD-CCNC: 47 U/L
ALT SERPL-CCNC: 44 U/L
ANION GAP SERPL CALC-SCNC: 11 MMOL/L
APPEARANCE: ABNORMAL
APTT BLD: 27.7 SEC
AST SERPL-CCNC: 36 U/L
BACTERIA: ABNORMAL
BASOPHILS # BLD AUTO: 0.05 K/UL
BASOPHILS NFR BLD AUTO: 0.3 %
BILIRUB SERPL-MCNC: 0.2 MG/DL
BILIRUB UR QL STRIP: NORMAL
BILIRUBIN URINE: NEGATIVE
BLOOD URINE: NEGATIVE
BUN SERPL-MCNC: 6 MG/DL
CALCIUM SERPL-MCNC: 9.6 MG/DL
CHLORIDE SERPL-SCNC: 103 MMOL/L
CO2 SERPL-SCNC: 23 MMOL/L
COLOR: YELLOW
CREAT SERPL-MCNC: 0.49 MG/DL
EOSINOPHIL # BLD AUTO: 0.25 K/UL
EOSINOPHIL NFR BLD AUTO: 1.7 %
FIBRINOGEN PPP COAG.DERIVED-MCNC: 639 MG/DL
GLUCOSE QUALITATIVE U: ABNORMAL
GLUCOSE SERPL-MCNC: 75 MG/DL
GLUCOSE UR-MCNC: NORMAL
HCG UR QL: 0.2 EU/DL
HCT VFR BLD CALC: 38.6 %
HGB BLD-MCNC: 12.1 G/DL
HGB UR QL STRIP.AUTO: NORMAL
HYALINE CASTS: 0 /LPF
IMM GRANULOCYTES NFR BLD AUTO: 0.6 %
INR PPP: 0.97 RATIO
KETONES UR-MCNC: NORMAL
KETONES URINE: NEGATIVE
LDH SERPL-CCNC: 174 U/L
LEUKOCYTE ESTERASE UR QL STRIP: NORMAL
LEUKOCYTE ESTERASE URINE: ABNORMAL
LYMPHOCYTES # BLD AUTO: 2.3 K/UL
LYMPHOCYTES NFR BLD AUTO: 15.9 %
MAN DIFF?: NORMAL
MCHC RBC-ENTMCNC: 29.8 PG
MCHC RBC-ENTMCNC: 31.3 GM/DL
MCV RBC AUTO: 95.1 FL
MICROSCOPIC-UA: NORMAL
MONOCYTES # BLD AUTO: 0.91 K/UL
MONOCYTES NFR BLD AUTO: 6.3 %
NEUTROPHILS # BLD AUTO: 10.85 K/UL
NEUTROPHILS NFR BLD AUTO: 75.2 %
NITRITE UR QL STRIP: NORMAL
NITRITE URINE: NEGATIVE
PH UR STRIP: 7
PH URINE: 6.5
PLATELET # BLD AUTO: 286 K/UL
POTASSIUM SERPL-SCNC: 3.9 MMOL/L
PROT SERPL-MCNC: 7.3 G/DL
PROT UR STRIP-MCNC: NORMAL
PROTEIN URINE: NORMAL
PT BLD: 11 SEC
RBC # BLD: 4.06 M/UL
RBC # FLD: 13.3 %
RED BLOOD CELLS URINE: 4 /HPF
SODIUM SERPL-SCNC: 138 MMOL/L
SP GR UR STRIP: 1.01
SPECIFIC GRAVITY URINE: 1.02
SQUAMOUS EPITHELIAL CELLS: 21 /HPF
URATE SERPL-MCNC: 3.1 MG/DL
UROBILINOGEN URINE: NORMAL
WBC # FLD AUTO: 14.44 K/UL
WHITE BLOOD CELLS URINE: 10 /HPF

## 2020-01-29 ENCOUNTER — APPOINTMENT (OUTPATIENT)
Dept: ANTEPARTUM | Facility: CLINIC | Age: 25
End: 2020-01-29
Payer: COMMERCIAL

## 2020-01-29 ENCOUNTER — ASOB RESULT (OUTPATIENT)
Age: 25
End: 2020-01-29

## 2020-01-29 PROCEDURE — 76816 OB US FOLLOW-UP PER FETUS: CPT

## 2020-02-04 ENCOUNTER — APPOINTMENT (OUTPATIENT)
Dept: OBGYN | Facility: CLINIC | Age: 25
End: 2020-02-04
Payer: COMMERCIAL

## 2020-02-04 ENCOUNTER — NON-APPOINTMENT (OUTPATIENT)
Age: 25
End: 2020-02-04

## 2020-02-04 VITALS
DIASTOLIC BLOOD PRESSURE: 60 MMHG | BODY MASS INDEX: 37.05 KG/M2 | HEIGHT: 64 IN | SYSTOLIC BLOOD PRESSURE: 122 MMHG | WEIGHT: 217 LBS

## 2020-02-04 PROCEDURE — 0502F SUBSEQUENT PRENATAL CARE: CPT

## 2020-02-05 LAB
BILIRUB UR QL STRIP: NORMAL
GLUCOSE UR-MCNC: NORMAL
HCG UR QL: 0.2 EU/DL
HGB UR QL STRIP.AUTO: NORMAL
KETONES UR-MCNC: NORMAL
LEUKOCYTE ESTERASE UR QL STRIP: NORMAL
NITRITE UR QL STRIP: NORMAL
PH UR STRIP: 7
PROT UR STRIP-MCNC: NORMAL
SP GR UR STRIP: 1.01

## 2020-02-11 ENCOUNTER — EMERGENCY (EMERGENCY)
Facility: HOSPITAL | Age: 25
LOS: 1 days | Discharge: HOME | End: 2020-02-11
Attending: EMERGENCY MEDICINE | Admitting: EMERGENCY MEDICINE
Payer: COMMERCIAL

## 2020-02-11 VITALS
TEMPERATURE: 98 F | RESPIRATION RATE: 18 BRPM | DIASTOLIC BLOOD PRESSURE: 76 MMHG | OXYGEN SATURATION: 100 % | SYSTOLIC BLOOD PRESSURE: 135 MMHG | HEART RATE: 99 BPM

## 2020-02-11 VITALS
HEART RATE: 107 BPM | SYSTOLIC BLOOD PRESSURE: 140 MMHG | RESPIRATION RATE: 20 BRPM | OXYGEN SATURATION: 100 % | DIASTOLIC BLOOD PRESSURE: 71 MMHG | TEMPERATURE: 98 F

## 2020-02-11 DIAGNOSIS — Z98.890 OTHER SPECIFIED POSTPROCEDURAL STATES: Chronic | ICD-10-CM

## 2020-02-11 LAB
FLU A RESULT: NOT DETECTED — SIGNIFICANT CHANGE UP
FLU A RESULT: NOT DETECTED — SIGNIFICANT CHANGE UP
FLUAV AG NPH QL: NOT DETECTED — SIGNIFICANT CHANGE UP
FLUBV AG NPH QL: NOT DETECTED — SIGNIFICANT CHANGE UP
RSV RESULT: SIGNIFICANT CHANGE UP
RSV RNA RESP QL NAA+PROBE: SIGNIFICANT CHANGE UP

## 2020-02-11 PROCEDURE — 99283 EMERGENCY DEPT VISIT LOW MDM: CPT

## 2020-02-11 PROCEDURE — 71045 X-RAY EXAM CHEST 1 VIEW: CPT | Mod: 26

## 2020-02-11 RX ORDER — SODIUM CHLORIDE 9 MG/ML
1000 INJECTION, SOLUTION INTRAVENOUS
Refills: 0 | Status: DISCONTINUED | OUTPATIENT
Start: 2020-02-11 | End: 2020-02-17

## 2020-02-11 RX ADMIN — SODIUM CHLORIDE 1000 MILLILITER(S): 9 INJECTION, SOLUTION INTRAVENOUS at 05:27

## 2020-02-11 NOTE — ED PROVIDER NOTE - PATIENT PORTAL LINK FT
You can access the FollowMyHealth Patient Portal offered by [facility] by registering at the following website: [email or web address]. By joining [employer]’s FollowMyHealth portal, you will also be able to view your health information using other applications (apps) compatible with our system.

## 2020-02-11 NOTE — ED ADULT NURSE NOTE - OBJECTIVE STATEMENT
Patient received in room 6,A&OX4, ambulatory. patient states she is 24 weeks pregnant, LMP 8/25/19. Patient reports cough, sore throat, chest congestion for past ten days. patient reports being seen at urgent care last week, states negative for Strep Throat and negative for Flu. Patient seen at PCP yesterday because symptoms have no resolved, PCP gave patient amoxicillin and Prednisone, patient took one dose of Amoxicillin, patient did not take Prednisone due to pregnancy and she did not want to. Patient denies nausea, vomiting, diarrhea, fever, chills, headache. Patient states she feels short of breath and chest pain with coughing, patient placed on pulse ox, 100% on room air. 20G placed in LT hand, fluids given as per MD order. RVP collected, education given to patient and family, Mother at bedside offered a mask. Patient in no acute distress, respirations even and unlabored. Will continue to monitor.

## 2020-02-11 NOTE — ED ADULT TRIAGE NOTE - CHIEF COMPLAINT QUOTE
Patient is , 24 weeks preg, presents to ED for flu-like sx.  Complaining of sore throat, cough, palpitations and chest pressure.  No nausea, vomiting or fever.  Denies abdominal pain, vaginal bleeding and discharge.  Seen at urgent care on Wednesday and saw PCP today.  LMP: 19, Dr. Olguin, OB/GYN.  Contacted L&D and would like patient evaluated in ED.

## 2020-02-11 NOTE — ED PROVIDER NOTE - CLINICAL SUMMARY MEDICAL DECISION MAKING FREE TEXT BOX
pt with symptoms most consistent with a viral syndrome - flu is in the differential so will swab as would still be a candidate for tamiflu in the setting of pregnancy.  Will treat symptomatically as well as get a CxR to eval for a PNA in the setting of a week of symptoms.  Limited medical interventions due to pregnancy status

## 2020-02-11 NOTE — ED PROVIDER NOTE - PHYSICAL EXAMINATION
Gen: Well appearing in NAD  Head: NC/AT  ENT: Post Adam clear no erythema or exudates  Neck: trachea midline  Resp:  No distress CTAB  CV: RRR  Ext: no deformities  Neuro:  A&O appears non focal  Skin:  Warm and dry as visualized  Psych:  Normal affect and mood

## 2020-02-11 NOTE — ED PROVIDER NOTE - OBJECTIVE STATEMENT
23 yo G1 @ 24 weeks by US with about week of cough productive of green sputum along with a sore throat and chills.  Went to PMD and UC and was told not strep.  Symptoms are making it hard for her to sleep.  Associated with some CP with coughing and intermittent SOB.  No vaginal or abdominal complaints and feels baby moving normally.  Sick contact at home with same.

## 2020-03-09 ENCOUNTER — APPOINTMENT (OUTPATIENT)
Dept: OBGYN | Facility: CLINIC | Age: 25
End: 2020-03-09
Payer: COMMERCIAL

## 2020-03-09 ENCOUNTER — NON-APPOINTMENT (OUTPATIENT)
Age: 25
End: 2020-03-09

## 2020-03-09 VITALS
DIASTOLIC BLOOD PRESSURE: 66 MMHG | HEIGHT: 64 IN | WEIGHT: 224 LBS | BODY MASS INDEX: 38.24 KG/M2 | SYSTOLIC BLOOD PRESSURE: 120 MMHG

## 2020-03-09 PROCEDURE — 0502F SUBSEQUENT PRENATAL CARE: CPT

## 2020-03-09 PROCEDURE — 36415 COLL VENOUS BLD VENIPUNCTURE: CPT

## 2020-03-12 LAB
BASOPHILS # BLD AUTO: 0.04 K/UL
BASOPHILS NFR BLD AUTO: 0.3 %
EOSINOPHIL # BLD AUTO: 0.29 K/UL
EOSINOPHIL NFR BLD AUTO: 2.4 %
GLUCOSE 1H P 50 G GLC PO SERPL-MCNC: 106 MG/DL
HCT VFR BLD CALC: 36.7 %
HGB BLD-MCNC: 11.5 G/DL
IMM GRANULOCYTES NFR BLD AUTO: 0.7 %
LYMPHOCYTES # BLD AUTO: 1.4 K/UL
LYMPHOCYTES NFR BLD AUTO: 11.5 %
MAN DIFF?: NORMAL
MCHC RBC-ENTMCNC: 29.5 PG
MCHC RBC-ENTMCNC: 31.3 GM/DL
MCV RBC AUTO: 94.1 FL
MONOCYTES # BLD AUTO: 0.67 K/UL
MONOCYTES NFR BLD AUTO: 5.5 %
NEUTROPHILS # BLD AUTO: 9.67 K/UL
NEUTROPHILS NFR BLD AUTO: 79.6 %
PLATELET # BLD AUTO: 252 K/UL
RBC # BLD: 3.9 M/UL
RBC # FLD: 13.5 %
T PALLIDUM AB SER QL IA: NEGATIVE
WBC # FLD AUTO: 12.15 K/UL

## 2020-03-31 ENCOUNTER — TRANSCRIPTION ENCOUNTER (OUTPATIENT)
Age: 25
End: 2020-03-31

## 2020-04-06 ENCOUNTER — APPOINTMENT (OUTPATIENT)
Dept: ANTEPARTUM | Facility: CLINIC | Age: 25
End: 2020-04-06
Payer: COMMERCIAL

## 2020-04-06 ENCOUNTER — ASOB RESULT (OUTPATIENT)
Age: 25
End: 2020-04-06

## 2020-04-06 ENCOUNTER — APPOINTMENT (OUTPATIENT)
Dept: OBGYN | Facility: CLINIC | Age: 25
End: 2020-04-06
Payer: COMMERCIAL

## 2020-04-06 ENCOUNTER — NON-APPOINTMENT (OUTPATIENT)
Age: 25
End: 2020-04-06

## 2020-04-06 VITALS
SYSTOLIC BLOOD PRESSURE: 120 MMHG | DIASTOLIC BLOOD PRESSURE: 64 MMHG | BODY MASS INDEX: 38.07 KG/M2 | WEIGHT: 223 LBS | HEIGHT: 64 IN

## 2020-04-06 LAB
BILIRUB UR QL STRIP: NORMAL
GLUCOSE UR-MCNC: NORMAL
HCG UR QL: 0.2 EU/DL
HGB UR QL STRIP.AUTO: NORMAL
KETONES UR-MCNC: NORMAL
LEUKOCYTE ESTERASE UR QL STRIP: NORMAL
NITRITE UR QL STRIP: NORMAL
PH UR STRIP: 6.5
PROT UR STRIP-MCNC: NORMAL
SP GR UR STRIP: 1.02

## 2020-04-06 PROCEDURE — 76819 FETAL BIOPHYS PROFIL W/O NST: CPT

## 2020-04-06 PROCEDURE — 76816 OB US FOLLOW-UP PER FETUS: CPT

## 2020-04-06 PROCEDURE — 0502F SUBSEQUENT PRENATAL CARE: CPT

## 2020-04-26 ENCOUNTER — MESSAGE (OUTPATIENT)
Age: 25
End: 2020-04-26

## 2020-05-04 ENCOUNTER — APPOINTMENT (OUTPATIENT)
Dept: OBGYN | Facility: CLINIC | Age: 25
End: 2020-05-04
Payer: COMMERCIAL

## 2020-05-04 ENCOUNTER — NON-APPOINTMENT (OUTPATIENT)
Age: 25
End: 2020-05-04

## 2020-05-04 VITALS
DIASTOLIC BLOOD PRESSURE: 62 MMHG | BODY MASS INDEX: 38.07 KG/M2 | WEIGHT: 223 LBS | HEIGHT: 64 IN | SYSTOLIC BLOOD PRESSURE: 124 MMHG

## 2020-05-04 PROCEDURE — 0502F SUBSEQUENT PRENATAL CARE: CPT

## 2020-05-04 PROCEDURE — 36415 COLL VENOUS BLD VENIPUNCTURE: CPT

## 2020-05-06 LAB
BASOPHILS # BLD AUTO: 0.03 K/UL
BASOPHILS NFR BLD AUTO: 0.2 %
BILIRUB UR QL STRIP: NORMAL
EOSINOPHIL # BLD AUTO: 0.17 K/UL
EOSINOPHIL NFR BLD AUTO: 1.3 %
GLUCOSE UR-MCNC: NORMAL
HCG UR QL: 0.2 EU/DL
HCT VFR BLD CALC: 41.6 %
HGB BLD-MCNC: 12.9 G/DL
HGB UR QL STRIP.AUTO: NORMAL
HIV1+2 AB SPEC QL IA.RAPID: NONREACTIVE
IMM GRANULOCYTES NFR BLD AUTO: 0.6 %
KETONES UR-MCNC: NORMAL
LEUKOCYTE ESTERASE UR QL STRIP: NORMAL
LYMPHOCYTES # BLD AUTO: 1.98 K/UL
LYMPHOCYTES NFR BLD AUTO: 15.4 %
MAN DIFF?: NORMAL
MCHC RBC-ENTMCNC: 29.2 PG
MCHC RBC-ENTMCNC: 31 GM/DL
MCV RBC AUTO: 94.1 FL
MONOCYTES # BLD AUTO: 0.66 K/UL
MONOCYTES NFR BLD AUTO: 5.1 %
NEUTROPHILS # BLD AUTO: 9.93 K/UL
NEUTROPHILS NFR BLD AUTO: 77.4 %
NITRITE UR QL STRIP: NORMAL
PH UR STRIP: 7
PLATELET # BLD AUTO: 257 K/UL
PROT UR STRIP-MCNC: NORMAL
RBC # BLD: 4.42 M/UL
RBC # FLD: 14.6 %
SP GR UR STRIP: 1.02
WBC # FLD AUTO: 12.85 K/UL

## 2020-05-07 LAB
GP B STREP DNA SPEC QL NAA+PROBE: NORMAL
GP B STREP DNA SPEC QL NAA+PROBE: NOT DETECTED
SOURCE GBS: NORMAL

## 2020-05-11 ENCOUNTER — NON-APPOINTMENT (OUTPATIENT)
Age: 25
End: 2020-05-11

## 2020-05-11 ENCOUNTER — APPOINTMENT (OUTPATIENT)
Dept: OBGYN | Facility: CLINIC | Age: 25
End: 2020-05-11
Payer: COMMERCIAL

## 2020-05-11 VITALS
WEIGHT: 235 LBS | HEIGHT: 64 IN | SYSTOLIC BLOOD PRESSURE: 122 MMHG | BODY MASS INDEX: 40.12 KG/M2 | DIASTOLIC BLOOD PRESSURE: 64 MMHG

## 2020-05-11 PROCEDURE — 0502F SUBSEQUENT PRENATAL CARE: CPT

## 2020-05-18 ENCOUNTER — NON-APPOINTMENT (OUTPATIENT)
Age: 25
End: 2020-05-18

## 2020-05-18 ENCOUNTER — APPOINTMENT (OUTPATIENT)
Dept: OBGYN | Facility: CLINIC | Age: 25
End: 2020-05-18
Payer: COMMERCIAL

## 2020-05-18 VITALS
BODY MASS INDEX: 40.63 KG/M2 | HEIGHT: 64 IN | SYSTOLIC BLOOD PRESSURE: 134 MMHG | WEIGHT: 238 LBS | DIASTOLIC BLOOD PRESSURE: 62 MMHG

## 2020-05-18 LAB
BILIRUB UR QL STRIP: NORMAL
GLUCOSE UR-MCNC: NORMAL
HCG UR QL: 0.2 EU/DL
HGB UR QL STRIP.AUTO: NORMAL
KETONES UR-MCNC: NORMAL
LEUKOCYTE ESTERASE UR QL STRIP: NORMAL
NITRITE UR QL STRIP: NORMAL
PH UR STRIP: 7
PROT UR STRIP-MCNC: NORMAL
SP GR UR STRIP: 1.02

## 2020-05-18 PROCEDURE — 0502F SUBSEQUENT PRENATAL CARE: CPT

## 2020-05-26 ENCOUNTER — APPOINTMENT (OUTPATIENT)
Dept: OBGYN | Facility: CLINIC | Age: 25
End: 2020-05-26
Payer: COMMERCIAL

## 2020-05-26 ENCOUNTER — NON-APPOINTMENT (OUTPATIENT)
Age: 25
End: 2020-05-26

## 2020-05-26 VITALS
BODY MASS INDEX: 40.63 KG/M2 | HEIGHT: 64 IN | SYSTOLIC BLOOD PRESSURE: 120 MMHG | WEIGHT: 238 LBS | DIASTOLIC BLOOD PRESSURE: 70 MMHG

## 2020-05-26 PROCEDURE — 0502F SUBSEQUENT PRENATAL CARE: CPT

## 2020-05-31 ENCOUNTER — INPATIENT (INPATIENT)
Facility: HOSPITAL | Age: 25
LOS: 1 days | Discharge: HOME | End: 2020-06-02
Attending: OBSTETRICS & GYNECOLOGY | Admitting: OBSTETRICS & GYNECOLOGY
Payer: COMMERCIAL

## 2020-05-31 ENCOUNTER — RESULT REVIEW (OUTPATIENT)
Age: 25
End: 2020-05-31

## 2020-05-31 VITALS
HEART RATE: 133 BPM | DIASTOLIC BLOOD PRESSURE: 82 MMHG | TEMPERATURE: 99 F | RESPIRATION RATE: 18 BRPM | SYSTOLIC BLOOD PRESSURE: 156 MMHG

## 2020-05-31 DIAGNOSIS — O26.899 OTHER SPECIFIED PREGNANCY RELATED CONDITIONS, UNSPECIFIED TRIMESTER: ICD-10-CM

## 2020-05-31 DIAGNOSIS — Z98.890 OTHER SPECIFIED POSTPROCEDURAL STATES: Chronic | ICD-10-CM

## 2020-05-31 DIAGNOSIS — Z3A.00 WEEKS OF GESTATION OF PREGNANCY NOT SPECIFIED: ICD-10-CM

## 2020-05-31 LAB
ALBUMIN SERPL ELPH-MCNC: 3.6 G/DL — SIGNIFICANT CHANGE UP (ref 3.3–5)
ALP SERPL-CCNC: 146 U/L — HIGH (ref 40–120)
ALT FLD-CCNC: 17 U/L — SIGNIFICANT CHANGE UP (ref 4–33)
ANION GAP SERPL CALC-SCNC: 16 MMO/L — HIGH (ref 7–14)
APPEARANCE UR: SIGNIFICANT CHANGE UP
AST SERPL-CCNC: 45 U/L — HIGH (ref 4–32)
BACTERIA # UR AUTO: SIGNIFICANT CHANGE UP
BASOPHILS # BLD AUTO: 0.03 K/UL — SIGNIFICANT CHANGE UP (ref 0–0.2)
BASOPHILS NFR BLD AUTO: 0.2 % — SIGNIFICANT CHANGE UP (ref 0–2)
BILIRUB SERPL-MCNC: < 0.2 MG/DL — LOW (ref 0.2–1.2)
BILIRUB UR-MCNC: NEGATIVE — SIGNIFICANT CHANGE UP
BLD GP AB SCN SERPL QL: NEGATIVE — SIGNIFICANT CHANGE UP
BLOOD UR QL VISUAL: SIGNIFICANT CHANGE UP
BUN SERPL-MCNC: 7 MG/DL — SIGNIFICANT CHANGE UP (ref 7–23)
CALCIUM SERPL-MCNC: 9.5 MG/DL — SIGNIFICANT CHANGE UP (ref 8.4–10.5)
CHLORIDE SERPL-SCNC: 106 MMOL/L — SIGNIFICANT CHANGE UP (ref 98–107)
CO2 SERPL-SCNC: 17 MMOL/L — LOW (ref 22–31)
COLOR SPEC: YELLOW — SIGNIFICANT CHANGE UP
CREAT ?TM UR-MCNC: 145.2 MG/DL — SIGNIFICANT CHANGE UP
CREAT SERPL-MCNC: 0.61 MG/DL — SIGNIFICANT CHANGE UP (ref 0.5–1.3)
EOSINOPHIL # BLD AUTO: 0.08 K/UL — SIGNIFICANT CHANGE UP (ref 0–0.5)
EOSINOPHIL NFR BLD AUTO: 0.6 % — SIGNIFICANT CHANGE UP (ref 0–6)
GLUCOSE SERPL-MCNC: 105 MG/DL — HIGH (ref 70–99)
GLUCOSE UR-MCNC: 500 — HIGH
HCT VFR BLD CALC: 39.4 % — SIGNIFICANT CHANGE UP (ref 34.5–45)
HGB BLD-MCNC: 12.9 G/DL — SIGNIFICANT CHANGE UP (ref 11.5–15.5)
HYALINE CASTS # UR AUTO: NEGATIVE — SIGNIFICANT CHANGE UP
IMM GRANULOCYTES NFR BLD AUTO: 0.6 % — SIGNIFICANT CHANGE UP (ref 0–1.5)
INR BLD: 1 — SIGNIFICANT CHANGE UP (ref 0.88–1.17)
KETONES UR-MCNC: SIGNIFICANT CHANGE UP
LDH SERPL L TO P-CCNC: 411 U/L — HIGH (ref 135–225)
LEUKOCYTE ESTERASE UR-ACNC: NEGATIVE — SIGNIFICANT CHANGE UP
LYMPHOCYTES # BLD AUTO: 1.57 K/UL — SIGNIFICANT CHANGE UP (ref 1–3.3)
LYMPHOCYTES # BLD AUTO: 11.9 % — LOW (ref 13–44)
MCHC RBC-ENTMCNC: 29.4 PG — SIGNIFICANT CHANGE UP (ref 27–34)
MCHC RBC-ENTMCNC: 32.7 % — SIGNIFICANT CHANGE UP (ref 32–36)
MCV RBC AUTO: 89.7 FL — SIGNIFICANT CHANGE UP (ref 80–100)
MONOCYTES # BLD AUTO: 0.67 K/UL — SIGNIFICANT CHANGE UP (ref 0–0.9)
MONOCYTES NFR BLD AUTO: 5.1 % — SIGNIFICANT CHANGE UP (ref 2–14)
NEUTROPHILS # BLD AUTO: 10.81 K/UL — HIGH (ref 1.8–7.4)
NEUTROPHILS NFR BLD AUTO: 81.6 % — HIGH (ref 43–77)
NITRITE UR-MCNC: NEGATIVE — SIGNIFICANT CHANGE UP
NRBC # FLD: 0 K/UL — SIGNIFICANT CHANGE UP (ref 0–0)
PH UR: 7 — SIGNIFICANT CHANGE UP (ref 5–8)
PLATELET # BLD AUTO: 215 K/UL — SIGNIFICANT CHANGE UP (ref 150–400)
PMV BLD: 11.9 FL — SIGNIFICANT CHANGE UP (ref 7–13)
POTASSIUM SERPL-MCNC: 4.4 MMOL/L — SIGNIFICANT CHANGE UP (ref 3.5–5.3)
POTASSIUM SERPL-SCNC: 4.4 MMOL/L — SIGNIFICANT CHANGE UP (ref 3.5–5.3)
PROT SERPL-MCNC: 6.9 G/DL — SIGNIFICANT CHANGE UP (ref 6–8.3)
PROT UR-MCNC: 30 — SIGNIFICANT CHANGE UP
PROT UR-MCNC: 31.6 MG/DL — SIGNIFICANT CHANGE UP
PROTHROM AB SERPL-ACNC: 11.4 SEC — SIGNIFICANT CHANGE UP (ref 9.8–13.1)
RBC # BLD: 4.39 M/UL — SIGNIFICANT CHANGE UP (ref 3.8–5.2)
RBC # FLD: 15.2 % — HIGH (ref 10.3–14.5)
RBC CASTS # UR COMP ASSIST: HIGH (ref 0–?)
RH IG SCN BLD-IMP: POSITIVE — SIGNIFICANT CHANGE UP
SODIUM SERPL-SCNC: 139 MMOL/L — SIGNIFICANT CHANGE UP (ref 135–145)
SP GR SPEC: 1.02 — SIGNIFICANT CHANGE UP (ref 1–1.04)
SQUAMOUS # UR AUTO: SIGNIFICANT CHANGE UP
URATE SERPL-MCNC: 4.2 MG/DL — SIGNIFICANT CHANGE UP (ref 2.5–7)
UROBILINOGEN FLD QL: NORMAL — SIGNIFICANT CHANGE UP
WBC # BLD: 13.24 K/UL — HIGH (ref 3.8–10.5)
WBC # FLD AUTO: 13.24 K/UL — HIGH (ref 3.8–10.5)
WBC UR QL: SIGNIFICANT CHANGE UP (ref 0–?)

## 2020-05-31 RX ORDER — SODIUM CHLORIDE 9 MG/ML
1000 INJECTION, SOLUTION INTRAVENOUS
Refills: 0 | Status: DISCONTINUED | OUTPATIENT
Start: 2020-05-31 | End: 2020-05-31

## 2020-05-31 RX ORDER — OXYTOCIN 10 UNIT/ML
333.33 VIAL (ML) INJECTION
Qty: 20 | Refills: 0 | Status: DISCONTINUED | OUTPATIENT
Start: 2020-05-31 | End: 2020-05-31

## 2020-05-31 RX ORDER — SODIUM CHLORIDE 9 MG/ML
1000 INJECTION, SOLUTION INTRAVENOUS
Refills: 0 | Status: DISCONTINUED | OUTPATIENT
Start: 2020-05-31 | End: 2020-06-01

## 2020-05-31 RX ORDER — OXYTOCIN 10 UNIT/ML
333.33 VIAL (ML) INJECTION
Qty: 20 | Refills: 0 | Status: DISCONTINUED | OUTPATIENT
Start: 2020-05-31 | End: 2020-06-01

## 2020-05-31 RX ADMIN — SODIUM CHLORIDE 125 MILLILITER(S): 9 INJECTION, SOLUTION INTRAVENOUS at 20:52

## 2020-05-31 NOTE — OB PROVIDER H&P - ASSESSMENT
25 y.o.  Citizen of [place] patient  INDER 2020 @ 40 weeks presents with c/o LOF, blood tinged since 4pm and ctx q 10 mins. Pt denies VB. States +FM and uncomplicated antepartum course.    allergies:  NKDA  medications:  prenatal vitamins    med/surg hx:  , 2018 left knee sx   OBGYN hx:  denies    abdomen soft, nontender  taus: sliup, cephalic presentation, posterior placenta, bpp 8/8, janet 3.6, efw 3857 grams  sse: negative pooling/nitrazine/ferning  sve: /-3/I  nst in progress    a/p: 25 y.o.  Citizen of [place] patient  INDER 2020 @ 40 weeks oligohydramnios    IOL with PO cyotec    GBS negative 2020    HELLP labs pending    to be discussed with Dr Haro  Discussed with Dr Pittman   Sussy, NP

## 2020-05-31 NOTE — OB RN PATIENT PROFILE - AS SC BRADEN ACTIVITY
Implemented All Fall with Harm Risk Interventions:  [place] to call system. Call bell, personal items and telephone within reach. Instruct patient to call for assistance. Room bathroom lighting operational. Non-slip footwear when patient is off stretcher. Physically safe environment: no spills, clutter or unnecessary equipment. Stretcher in lowest position, wheels locked, appropriate side rails in place. Provide visual cue, wrist band, yellow gown, etc. Monitor gait and stability. Monitor for mental status changes and reorient to person, place, and time. Review medications for side effects contributing to fall risk. Reinforce activity limits and safety measures with patient and family. Provide visual clues: red socks.
(4) walks frequently

## 2020-05-31 NOTE — OB PROVIDER H&P - HISTORY OF PRESENT ILLNESS
25 y.o.  [place] Islander patient  INDER 2020 @ 40 weeks presents with c/o LOF, blood tinged since 4pm and ctx q 10 mins. Pt denies VB. States +FM and uncomplicated antepartum course.

## 2020-05-31 NOTE — OB PROVIDER TRIAGE NOTE - NSOBPROVIDERNOTE_OBGYN_ALL_OB_FT
25 y.o.  [place] patient  INDER 2020 @ 40 weeks presents with c/o LOF, blood tinged since 4pm and ctx q 10 mins. Pt denies VB. States +FM and uncomplicated antepartum course.    allergies:  NKDA  medications:  prenatal vitamins    med/surg hx:  2016, 2018 left knee sx   OBGYN hx:  denies    abdomen soft, nontender  sse: negative pooling/nitrazine/ferning  sve: /-3/I  nst in progress 25 y.o.  [place] patient  INDER 2020 @ 40 weeks presents with c/o LOF, blood tinged since 4pm and ctx q 10 mins. Pt denies VB. States +FM and uncomplicated antepartum course.    allergies:  NKDA  medications:  prenatal vitamins    med/surg hx:  , 2018 left knee sx   OBGYN hx:  denies    abdomen soft, nontender  taus: sliup, cephalic presentation, posterior placenta, bpp 8/8, janet 3.6, efw 3857 grams  sse: negative pooling/nitrazine/ferning  sve: /-3/I  nst in progress    a/p: 25 y.o.  [place] patient  INDER 2020 @ 40 weeks oligohydramnios    IOL with PO cyotec    GBS negative 2020    HELLP labs pending    Discussed with      MARY KAY Hi 25 y.o.  [place] patient  INDER 2020 @ 40 weeks presents with c/o LOF, blood tinged since 4pm and ctx q 10 mins. Pt denies VB. States +FM and uncomplicated antepartum course.    allergies:  NKDA  medications:  prenatal vitamins    med/surg hx:  , 2018 left knee sx   OBGYN hx:  denies    abdomen soft, nontender  taus: sliup, cephalic presentation, posterior placenta, bpp 8/8, janet 3.6, efw 3857 grams  sse: negative pooling/nitrazine/ferning  sve: /-3/I  nst in progress    a/p: 25 y.o.  [place] patient  INDER 2020 @ 40 weeks oligohydramnios    IOL with PO cyotec    GBS negative 2020    HELLP labs pending    to be discussed with Dr Haro  Discussed with Dr Pittman   Sussy, NP

## 2020-05-31 NOTE — OB PROVIDER TRIAGE NOTE - HISTORY OF PRESENT ILLNESS
25 y.o.  [place] patient  INDER 2020 @ 40 weeks presents with c/o LOF, blood tinged since 4pm and ctx q 10 mins. Pt denies VB. States +FM and uncomplicated antepartum course. no

## 2020-06-01 LAB
ALBUMIN SERPL ELPH-MCNC: 3.9 G/DL — SIGNIFICANT CHANGE UP (ref 3.3–5)
ALP SERPL-CCNC: 153 U/L — HIGH (ref 40–120)
ALT FLD-CCNC: 11 U/L — SIGNIFICANT CHANGE UP (ref 4–33)
ANION GAP SERPL CALC-SCNC: 17 MMO/L — HIGH (ref 7–14)
APTT BLD: 27.3 SEC — LOW (ref 27.5–36.3)
AST SERPL-CCNC: 16 U/L — SIGNIFICANT CHANGE UP (ref 4–32)
BASOPHILS # BLD AUTO: 0.05 K/UL — SIGNIFICANT CHANGE UP (ref 0–0.2)
BASOPHILS NFR BLD AUTO: 0.3 % — SIGNIFICANT CHANGE UP (ref 0–2)
BILIRUB SERPL-MCNC: 0.3 MG/DL — SIGNIFICANT CHANGE UP (ref 0.2–1.2)
BUN SERPL-MCNC: 7 MG/DL — SIGNIFICANT CHANGE UP (ref 7–23)
CALCIUM SERPL-MCNC: 9.2 MG/DL — SIGNIFICANT CHANGE UP (ref 8.4–10.5)
CHLORIDE SERPL-SCNC: 103 MMOL/L — SIGNIFICANT CHANGE UP (ref 98–107)
CO2 SERPL-SCNC: 17 MMOL/L — LOW (ref 22–31)
CREAT SERPL-MCNC: 0.53 MG/DL — SIGNIFICANT CHANGE UP (ref 0.5–1.3)
EOSINOPHIL # BLD AUTO: 0.01 K/UL — SIGNIFICANT CHANGE UP (ref 0–0.5)
EOSINOPHIL NFR BLD AUTO: 0.1 % — SIGNIFICANT CHANGE UP (ref 0–6)
FIBRINOGEN PPP-MCNC: 708 MG/DL — HIGH (ref 300–520)
GLUCOSE SERPL-MCNC: 115 MG/DL — HIGH (ref 70–99)
HCT VFR BLD CALC: 40.5 % — SIGNIFICANT CHANGE UP (ref 34.5–45)
HGB BLD-MCNC: 13.3 G/DL — SIGNIFICANT CHANGE UP (ref 11.5–15.5)
IMM GRANULOCYTES NFR BLD AUTO: 0.8 % — SIGNIFICANT CHANGE UP (ref 0–1.5)
INR BLD: 1 — SIGNIFICANT CHANGE UP (ref 0.88–1.17)
LDH SERPL L TO P-CCNC: 172 U/L — SIGNIFICANT CHANGE UP (ref 135–225)
LYMPHOCYTES # BLD AUTO: 1.54 K/UL — SIGNIFICANT CHANGE UP (ref 1–3.3)
LYMPHOCYTES # BLD AUTO: 8.1 % — LOW (ref 13–44)
MCHC RBC-ENTMCNC: 29.6 PG — SIGNIFICANT CHANGE UP (ref 27–34)
MCHC RBC-ENTMCNC: 32.8 % — SIGNIFICANT CHANGE UP (ref 32–36)
MCV RBC AUTO: 90 FL — SIGNIFICANT CHANGE UP (ref 80–100)
MONOCYTES # BLD AUTO: 0.53 K/UL — SIGNIFICANT CHANGE UP (ref 0–0.9)
MONOCYTES NFR BLD AUTO: 2.8 % — SIGNIFICANT CHANGE UP (ref 2–14)
NEUTROPHILS # BLD AUTO: 16.81 K/UL — HIGH (ref 1.8–7.4)
NEUTROPHILS NFR BLD AUTO: 87.9 % — HIGH (ref 43–77)
NRBC # FLD: 0 K/UL — SIGNIFICANT CHANGE UP (ref 0–0)
PLATELET # BLD AUTO: 251 K/UL — SIGNIFICANT CHANGE UP (ref 150–400)
PMV BLD: 10.9 FL — SIGNIFICANT CHANGE UP (ref 7–13)
POTASSIUM SERPL-MCNC: 3.7 MMOL/L — SIGNIFICANT CHANGE UP (ref 3.5–5.3)
POTASSIUM SERPL-SCNC: 3.7 MMOL/L — SIGNIFICANT CHANGE UP (ref 3.5–5.3)
PROT SERPL-MCNC: 7.3 G/DL — SIGNIFICANT CHANGE UP (ref 6–8.3)
PROTHROM AB SERPL-ACNC: 11.5 SEC — SIGNIFICANT CHANGE UP (ref 9.8–13.1)
RBC # BLD: 4.5 M/UL — SIGNIFICANT CHANGE UP (ref 3.8–5.2)
RBC # FLD: 15.4 % — HIGH (ref 10.3–14.5)
SARS-COV-2 RNA SPEC QL NAA+PROBE: SIGNIFICANT CHANGE UP
SODIUM SERPL-SCNC: 137 MMOL/L — SIGNIFICANT CHANGE UP (ref 135–145)
T PALLIDUM AB TITR SER: NEGATIVE — SIGNIFICANT CHANGE UP
URATE SERPL-MCNC: 4.1 MG/DL — SIGNIFICANT CHANGE UP (ref 2.5–7)
WBC # BLD: 19.1 K/UL — HIGH (ref 3.8–10.5)
WBC # FLD AUTO: 19.1 K/UL — HIGH (ref 3.8–10.5)

## 2020-06-01 PROCEDURE — 88307 TISSUE EXAM BY PATHOLOGIST: CPT | Mod: 26

## 2020-06-01 PROCEDURE — 59400 OBSTETRICAL CARE: CPT

## 2020-06-01 RX ORDER — AER TRAVELER 0.5 G/1
1 SOLUTION RECTAL; TOPICAL EVERY 4 HOURS
Refills: 0 | Status: DISCONTINUED | OUTPATIENT
Start: 2020-06-01 | End: 2020-06-02

## 2020-06-01 RX ORDER — KETOROLAC TROMETHAMINE 30 MG/ML
30 SYRINGE (ML) INJECTION ONCE
Refills: 0 | Status: DISCONTINUED | OUTPATIENT
Start: 2020-06-01 | End: 2020-06-01

## 2020-06-01 RX ORDER — TETANUS TOXOID, REDUCED DIPHTHERIA TOXOID AND ACELLULAR PERTUSSIS VACCINE, ADSORBED 5; 2.5; 8; 8; 2.5 [IU]/.5ML; [IU]/.5ML; UG/.5ML; UG/.5ML; UG/.5ML
0.5 SUSPENSION INTRAMUSCULAR ONCE
Refills: 0 | Status: DISCONTINUED | OUTPATIENT
Start: 2020-06-01 | End: 2020-06-02

## 2020-06-01 RX ORDER — IBUPROFEN 200 MG
600 TABLET ORAL EVERY 6 HOURS
Refills: 0 | Status: DISCONTINUED | OUTPATIENT
Start: 2020-06-01 | End: 2020-06-02

## 2020-06-01 RX ORDER — ACETAMINOPHEN 500 MG
975 TABLET ORAL
Refills: 0 | Status: DISCONTINUED | OUTPATIENT
Start: 2020-06-01 | End: 2020-06-02

## 2020-06-01 RX ORDER — SENNA PLUS 8.6 MG/1
2 TABLET ORAL AT BEDTIME
Refills: 0 | Status: DISCONTINUED | OUTPATIENT
Start: 2020-06-01 | End: 2020-06-02

## 2020-06-01 RX ORDER — BENZOCAINE 10 %
1 GEL (GRAM) MUCOUS MEMBRANE EVERY 6 HOURS
Refills: 0 | Status: DISCONTINUED | OUTPATIENT
Start: 2020-06-01 | End: 2020-06-02

## 2020-06-01 RX ORDER — PRAMOXINE HYDROCHLORIDE 150 MG/15G
1 AEROSOL, FOAM RECTAL EVERY 4 HOURS
Refills: 0 | Status: DISCONTINUED | OUTPATIENT
Start: 2020-06-01 | End: 2020-06-02

## 2020-06-01 RX ORDER — SODIUM CHLORIDE 9 MG/ML
3 INJECTION INTRAMUSCULAR; INTRAVENOUS; SUBCUTANEOUS EVERY 8 HOURS
Refills: 0 | Status: DISCONTINUED | OUTPATIENT
Start: 2020-06-01 | End: 2020-06-02

## 2020-06-01 RX ORDER — SIMETHICONE 80 MG/1
80 TABLET, CHEWABLE ORAL EVERY 4 HOURS
Refills: 0 | Status: DISCONTINUED | OUTPATIENT
Start: 2020-06-01 | End: 2020-06-02

## 2020-06-01 RX ORDER — DIBUCAINE 1 %
1 OINTMENT (GRAM) RECTAL EVERY 6 HOURS
Refills: 0 | Status: DISCONTINUED | OUTPATIENT
Start: 2020-06-01 | End: 2020-06-02

## 2020-06-01 RX ORDER — OXYCODONE HYDROCHLORIDE 5 MG/1
5 TABLET ORAL
Refills: 0 | Status: DISCONTINUED | OUTPATIENT
Start: 2020-06-01 | End: 2020-06-02

## 2020-06-01 RX ORDER — OXYTOCIN 10 UNIT/ML
333.33 VIAL (ML) INJECTION
Qty: 20 | Refills: 0 | Status: DISCONTINUED | OUTPATIENT
Start: 2020-06-01 | End: 2020-06-01

## 2020-06-01 RX ORDER — OXYCODONE HYDROCHLORIDE 5 MG/1
5 TABLET ORAL ONCE
Refills: 0 | Status: DISCONTINUED | OUTPATIENT
Start: 2020-06-01 | End: 2020-06-02

## 2020-06-01 RX ORDER — IBUPROFEN 200 MG
600 TABLET ORAL EVERY 6 HOURS
Refills: 0 | Status: COMPLETED | OUTPATIENT
Start: 2020-06-01 | End: 2021-04-30

## 2020-06-01 RX ORDER — MAGNESIUM HYDROXIDE 400 MG/1
30 TABLET, CHEWABLE ORAL
Refills: 0 | Status: DISCONTINUED | OUTPATIENT
Start: 2020-06-01 | End: 2020-06-02

## 2020-06-01 RX ORDER — LANOLIN
1 OINTMENT (GRAM) TOPICAL EVERY 6 HOURS
Refills: 0 | Status: DISCONTINUED | OUTPATIENT
Start: 2020-06-01 | End: 2020-06-02

## 2020-06-01 RX ORDER — DIPHENHYDRAMINE HCL 50 MG
25 CAPSULE ORAL EVERY 6 HOURS
Refills: 0 | Status: DISCONTINUED | OUTPATIENT
Start: 2020-06-01 | End: 2020-06-02

## 2020-06-01 RX ORDER — HYDROCORTISONE 1 %
1 OINTMENT (GRAM) TOPICAL EVERY 6 HOURS
Refills: 0 | Status: DISCONTINUED | OUTPATIENT
Start: 2020-06-01 | End: 2020-06-02

## 2020-06-01 RX ADMIN — Medication 975 MILLIGRAM(S): at 06:45

## 2020-06-01 RX ADMIN — SENNA PLUS 2 TABLET(S): 8.6 TABLET ORAL at 23:20

## 2020-06-01 RX ADMIN — Medication 30 MILLIGRAM(S): at 04:15

## 2020-06-01 RX ADMIN — SODIUM CHLORIDE 3 MILLILITER(S): 9 INJECTION INTRAMUSCULAR; INTRAVENOUS; SUBCUTANEOUS at 23:22

## 2020-06-01 RX ADMIN — Medication 600 MILLIGRAM(S): at 14:34

## 2020-06-01 RX ADMIN — Medication 600 MILLIGRAM(S): at 20:35

## 2020-06-01 RX ADMIN — SODIUM CHLORIDE 3 MILLILITER(S): 9 INJECTION INTRAMUSCULAR; INTRAVENOUS; SUBCUTANEOUS at 14:34

## 2020-06-01 NOTE — OB NEONATOLOGY/PEDIATRICIAN DELIVERY SUMMARY - NSDELIVERYTYPEA_OBGYN_ALL_OB
PROCEDURE:  CT HEAD WITH AND WITHOUT CONTRAST



HISTORY:

right sided facial/headache/blurry vision rt eye



COMPARISON:

None available. 



TECHNIQUE:

Axial computed tomography images were obtained through the head/brain 

with and without intravenous contrast enhancement. 



Contrast dose: 90 mL Omnipaque 300



Radiation dose:



Total exam DLP = 1748.04 mGy-cm.



This CT exam was performed using one or more of the following dose 

reduction techniques: Automated exposure control, adjustment of the 

mA and/or kV according to patient size, and/or use of iterative 

reconstruction technique.



FINDINGS:



HEMORRHAGE:

No intracranial hemorrhage. 



BRAIN:

Gray-white matter differentiation is preserved.  There is no mass, 

mass effect or abnormal extra-axial fluid collection.  There is no 

territorial infarction. There is no abnormal parenchymal or 

leptomeningeal enhancement.



VENTRICLES:

The ventricles are normal in size, shape and configuration.



CALVARIUM:

The skull base and calvarium are normal.



SINUSES:

There is near complete opacification of the right maxillary sinus and 

severe mucosal thickening in the right ethmoid air cells with 

polypoid soft tissue in the right nasal cavity. There is also 

moderate mucosal thickening in the right inferior frontal sinus. The 

left-sided paranasal sinuses are predominantly clear. 



MASTOID AIR CELLS:

Unremarkable as visualized. No mastoid effusion. 



OTHER FINDINGS:

None.



IMPRESSION:

1. No acute intracranial abnormality. If there is a persistent focal 

neurologic deficit and an ongoing clinical concern for acute 

infarction or demyelination, an MRI of the brain without intravenous 

contrast would be a more sensitive modality for evaluation of 

hyperacute/acute ischemic infarction and demyelination. 



2. Right-sided acute and/ chronic sinusitis. Clinical correlation and 

follow-up is advised.
Vaginal Delivery

## 2020-06-01 NOTE — OB PROVIDER DELIVERY SUMMARY - NSPROVIDERDELIVERYNOTE_OBGYN_ALL_OB_FT
Patient came into labor room at 4cm and precipitously made change to 10/100/+1. Patient began to feel urge to push. With contractions and pushing, FHT with reccurent late and prolonged decelerations. Decision made to vacuum-assist delivery with mityvac @ 10/100/+2. Mityvac placed without issue. After first pull, decision made to perform RML episiotomy. With second pull baby was delivered. Two pulls used - in green zone, no pop offs, delivery of liveborn infant from CHRIS position. Head delivered, mity vac quickly released and removed. Shoulders and body delivered easily. Pediatrics present at delivery. Infant was suctioned. No mec. Cord clamped and cut and infant was handed to awaiting pediatricians. Placenta delivered intact with a 3 vessel cord. Fundal massage was given and uterine fundus was found to be firm. Vaginal exam revealed an intact cervix, vaginal walls and sulci. Patient had an RML episiotomy with 3rd degree extension in the perineum. Repaired with 2.0 and 3.0 chromic suture. Sutures placed to secure muscles, and two following anteriorly and posteriorly. Repaired in usual fashion after secured, skin repaired subcuticular fashion, with few interrupted sutures. Excellent hemostasis was noted. Cytotec VA given prophylactically 2/2 precipitous delivery and laceration. Patient was stable and went to recovery. Count was correct x 2.    Brian Raymundo PGY1

## 2020-06-01 NOTE — OB RN DELIVERY SUMMARY - NS_SEPSISRSKCALC_OBGYN_ALL_OB_FT
EOS calculated successfully. EOS Risk Factor: 0.5/1000 live births ([facility] national incidence); GA=40w1d; Temp=99.32; ROM=11.217; GBS='Negative'; Antibiotics='No antibiotics or any antibiotics < 2 hrs prior to birth'

## 2020-06-01 NOTE — OB NEONATOLOGY/PEDIATRICIAN DELIVERY SUMMARY - NSPEDSNEONOTESA_OBGYN_ALL_OB_FT
Baby girl born at 40.1 wks via VAVD to a 24 y/o  blood type B+ mother. Prenatal history of oligohydramnios. No significant maternal history. PNL nr/immune/-, GBS - on . ROM 11 hours prior to delivery with clear fluids. Peds called to delivery for cat II tracing, vacuum assist, and questionable placental abruption. Baby emerged vigorous, crying, was w/d/s/s with APGARS of 8,9. Mom would like to breast/bottle feed, requests/declines Hep B and consents/declines circ. EOS 0.27.

## 2020-06-02 ENCOUNTER — TRANSCRIPTION ENCOUNTER (OUTPATIENT)
Age: 25
End: 2020-06-02

## 2020-06-02 VITALS
DIASTOLIC BLOOD PRESSURE: 69 MMHG | OXYGEN SATURATION: 100 % | RESPIRATION RATE: 17 BRPM | SYSTOLIC BLOOD PRESSURE: 139 MMHG | TEMPERATURE: 98 F | HEART RATE: 104 BPM

## 2020-06-02 LAB
BASOPHILS # BLD AUTO: 0.04 K/UL — SIGNIFICANT CHANGE UP (ref 0–0.2)
BASOPHILS # BLD AUTO: 0.05 K/UL — SIGNIFICANT CHANGE UP (ref 0–0.2)
BASOPHILS NFR BLD AUTO: 0.2 % — SIGNIFICANT CHANGE UP (ref 0–2)
BASOPHILS NFR BLD AUTO: 0.3 % — SIGNIFICANT CHANGE UP (ref 0–2)
EOSINOPHIL # BLD AUTO: 0.06 K/UL — SIGNIFICANT CHANGE UP (ref 0–0.5)
EOSINOPHIL # BLD AUTO: 0.13 K/UL — SIGNIFICANT CHANGE UP (ref 0–0.5)
EOSINOPHIL NFR BLD AUTO: 0.4 % — SIGNIFICANT CHANGE UP (ref 0–6)
EOSINOPHIL NFR BLD AUTO: 0.9 % — SIGNIFICANT CHANGE UP (ref 0–6)
HCT VFR BLD CALC: 30.4 % — LOW (ref 34.5–45)
HCT VFR BLD CALC: 30.5 % — LOW (ref 34.5–45)
HGB BLD-MCNC: 10 G/DL — LOW (ref 11.5–15.5)
HGB BLD-MCNC: 9.9 G/DL — LOW (ref 11.5–15.5)
IMM GRANULOCYTES NFR BLD AUTO: 0.5 % — SIGNIFICANT CHANGE UP (ref 0–1.5)
IMM GRANULOCYTES NFR BLD AUTO: 0.5 % — SIGNIFICANT CHANGE UP (ref 0–1.5)
LYMPHOCYTES # BLD AUTO: 13.6 % — SIGNIFICANT CHANGE UP (ref 13–44)
LYMPHOCYTES # BLD AUTO: 16.6 % — SIGNIFICANT CHANGE UP (ref 13–44)
LYMPHOCYTES # BLD AUTO: 2.23 K/UL — SIGNIFICANT CHANGE UP (ref 1–3.3)
LYMPHOCYTES # BLD AUTO: 2.49 K/UL — SIGNIFICANT CHANGE UP (ref 1–3.3)
MCHC RBC-ENTMCNC: 29.8 PG — SIGNIFICANT CHANGE UP (ref 27–34)
MCHC RBC-ENTMCNC: 29.8 PG — SIGNIFICANT CHANGE UP (ref 27–34)
MCHC RBC-ENTMCNC: 32.6 % — SIGNIFICANT CHANGE UP (ref 32–36)
MCHC RBC-ENTMCNC: 32.8 % — SIGNIFICANT CHANGE UP (ref 32–36)
MCV RBC AUTO: 90.8 FL — SIGNIFICANT CHANGE UP (ref 80–100)
MCV RBC AUTO: 91.6 FL — SIGNIFICANT CHANGE UP (ref 80–100)
MONOCYTES # BLD AUTO: 0.7 K/UL — SIGNIFICANT CHANGE UP (ref 0–0.9)
MONOCYTES # BLD AUTO: 1.03 K/UL — HIGH (ref 0–0.9)
MONOCYTES NFR BLD AUTO: 4.7 % — SIGNIFICANT CHANGE UP (ref 2–14)
MONOCYTES NFR BLD AUTO: 6.3 % — SIGNIFICANT CHANGE UP (ref 2–14)
NEUTROPHILS # BLD AUTO: 11.55 K/UL — HIGH (ref 1.8–7.4)
NEUTROPHILS # BLD AUTO: 12.92 K/UL — HIGH (ref 1.8–7.4)
NEUTROPHILS NFR BLD AUTO: 77 % — SIGNIFICANT CHANGE UP (ref 43–77)
NEUTROPHILS NFR BLD AUTO: 79 % — HIGH (ref 43–77)
NRBC # FLD: 0 K/UL — SIGNIFICANT CHANGE UP (ref 0–0)
NRBC # FLD: 0 K/UL — SIGNIFICANT CHANGE UP (ref 0–0)
PLATELET # BLD AUTO: 222 K/UL — SIGNIFICANT CHANGE UP (ref 150–400)
PLATELET # BLD AUTO: 239 K/UL — SIGNIFICANT CHANGE UP (ref 150–400)
PMV BLD: 11 FL — SIGNIFICANT CHANGE UP (ref 7–13)
PMV BLD: 11.4 FL — SIGNIFICANT CHANGE UP (ref 7–13)
RBC # BLD: 3.32 M/UL — LOW (ref 3.8–5.2)
RBC # BLD: 3.36 M/UL — LOW (ref 3.8–5.2)
RBC # FLD: 15.6 % — HIGH (ref 10.3–14.5)
RBC # FLD: 15.8 % — HIGH (ref 10.3–14.5)
WBC # BLD: 15 K/UL — HIGH (ref 3.8–10.5)
WBC # BLD: 16.36 K/UL — HIGH (ref 3.8–10.5)
WBC # FLD AUTO: 15 K/UL — HIGH (ref 3.8–10.5)
WBC # FLD AUTO: 16.36 K/UL — HIGH (ref 3.8–10.5)

## 2020-06-02 RX ADMIN — Medication 600 MILLIGRAM(S): at 06:52

## 2020-06-02 RX ADMIN — SODIUM CHLORIDE 3 MILLILITER(S): 9 INJECTION INTRAMUSCULAR; INTRAVENOUS; SUBCUTANEOUS at 14:25

## 2020-06-02 RX ADMIN — SODIUM CHLORIDE 3 MILLILITER(S): 9 INJECTION INTRAMUSCULAR; INTRAVENOUS; SUBCUTANEOUS at 06:54

## 2020-06-02 NOTE — PROVIDER CONTACT NOTE (OTHER) - ACTION/TREATMENT ORDERED:
Patient encouraged to hydrate and CBC to be drawn
CBC to be repeated at 9am  Encouraged hydration  BONIFACIO Bui PGY 1 aware of plan

## 2020-06-02 NOTE — PROGRESS NOTE ADULT - ASSESSMENT
A/P: 26yo with gHTN PPD#1 s/p VAVD c/b 3rd degree laceration. Patient is stable and doing well post-partum.

## 2020-06-02 NOTE — DISCHARGE NOTE OB - CARE PROVIDER_API CALL
[facility] - GENERAL OTHER  [id number] [place] [address]  Phone: [phone]  Fax: [phone]  Established Patient  Follow Up Time:

## 2020-06-02 NOTE — PROGRESS NOTE ADULT - ATTENDING COMMENTS
Pt seen and examined and agree with above  pt requesting d/c home today.  Denies dizziness/lightheadedness/chest pain/dyspnea. minimal vb  mild tachycardia - H/h stable (9.9)  asymptomatic. NOrmal shock index  D/c home today

## 2020-06-02 NOTE — DISCHARGE NOTE OB - PATIENT PORTAL LINK FT
You can access the FollowMyHealth Patient Portal offered by [facility] by registering at the following website: [email or web address]. By joining [employer]’s FollowMyHealth portal, you will also be able to view your health information using other applications (apps) compatible with our system.

## 2020-06-02 NOTE — PROGRESS NOTE ADULT - SUBJECTIVE AND OBJECTIVE BOX
OB Progress Note: VAVD PPD#1    S: 24yo with gHTN PPD#1 s/p VAVD c/b 3rd degree laceration. Patient feels well. Pain is well controlled. She is tolerating a regular diet and passing flatus. She is voiding spontaneously, and ambulating without difficulty. Endorses light vaginal bleeding, soaking less than 1 pad/hour. Denies CP/SOB. Denies lightheadedness/dizziness. Denies N/V.     O:  Vitals:  Vital Signs Last 24 Hrs  T(C): 36.5 (02 Jun 2020 05:49), Max: 37 (02 Jun 2020 02:09)  T(F): 97.7 (02 Jun 2020 05:49), Max: 98.6 (02 Jun 2020 02:09)  HR: 106 (02 Jun 2020 05:49) (101 - 116)  BP: 133/78 (02 Jun 2020 05:49) (119/54 - 141/72)  BP(mean): --  RR: 18 (02 Jun 2020 05:49) (17 - 18)  SpO2: 100% (02 Jun 2020 05:49) (100% - 100%)    Physical Exam:  General: NAD  Heart: all extremities well perfused  Lungs: breathing comfortably  Abdomen: soft, non-tender, non-distended, fundus firm  Extremities: No calf tenderness or erythema    Labs:  Blood type: B Positive  Rubella IgG: RPR: Negative                          10.0<L>   16.36<H> >-----------< 239    ( 06-02 @ 03:05 )             30.5<L>                        13.3   19.10<H> >-----------< 251    ( 06-01 @ 02:14 )             40.5                        12.9   13.24<H> >-----------< 215    ( 05-31 @ 19:00 )             39.4    06-01-20 @ 02:14      137  |  103  |  7   ----------------------------<  115<H>  3.7   |  17<L>  |  0.53    05-31-20 @ 19:00      139  |  106  |  7   ----------------------------<  105<H>  4.4   |  17<L>  |  0.61        Ca    9.2      01 Jun 2020 02:14  Ca    9.5      31 May 2020 19:00    TPro  7.3  /  Alb  3.9  /  TBili  0.3  /  DBili  x   /  AST  16  /  ALT  11  /  AlkPhos  153<H>  06-01-20 @ 02:14  TPro  6.9  /  Alb  3.6  /  TBili  < 0.2<L>  /  DBili  x   /  AST  45<H>  /  ALT  17  /  AlkPhos  146<H>  05-31-20 @ 19:00

## 2020-06-02 NOTE — PROVIDER CONTACT NOTE (OTHER) - ASSESSMENT
Patient tachycardic throughout day in low 100's. Patient asymptomatic.
Heart:  ausculatated  Lungs: CTABL  Lochia: WNL  Vaginal: sutures intact, no hematoma  Extremities: no calf tenderness

## 2020-06-02 NOTE — PROVIDER CONTACT NOTE (OTHER) - BACKGROUND
Patient with tachycardia however asymptomatic. Patient with tachycardia however asymptomatic.  Denies dizziness, chest pain, or palpitations. Patient with tachycardia however asymptomatic.  Denies dizziness, chest pain, or palpitations.  13.3/40.5-->10/30.5

## 2020-06-02 NOTE — PROGRESS NOTE ADULT - PROBLEM SELECTOR PLAN 1
- Low grade tachycardic overnight but asymptomatic. F/u 9AM CBC.  - Pain well controlled, continue current pain regimen  - Continue ambulation, SCDs when not ambulating  - Continue regular diet  - No evidence of ongoing bleeding.    Kathy Husain, PGY-1

## 2020-06-02 NOTE — DISCHARGE NOTE OB - CARE PLAN
Principal Discharge DX:	Vacuum extractor delivery  Goal:	fully recovered  Assessment and plan of treatment:	dr fernandes 6 weeks postpartum

## 2020-06-15 LAB — SURGICAL PATHOLOGY STUDY: SIGNIFICANT CHANGE UP

## 2020-06-30 ENCOUNTER — APPOINTMENT (OUTPATIENT)
Dept: OBGYN | Facility: CLINIC | Age: 25
End: 2020-06-30
Payer: COMMERCIAL

## 2020-06-30 VITALS
WEIGHT: 215 LBS | HEIGHT: 64 IN | SYSTOLIC BLOOD PRESSURE: 122 MMHG | BODY MASS INDEX: 36.7 KG/M2 | DIASTOLIC BLOOD PRESSURE: 72 MMHG

## 2020-06-30 PROCEDURE — 0503F POSTPARTUM CARE VISIT: CPT

## 2020-06-30 PROCEDURE — 81003 URINALYSIS AUTO W/O SCOPE: CPT | Mod: QW

## 2020-06-30 NOTE — HISTORY OF PRESENT ILLNESS
[Complications:___] : no complications [Postpartum Follow Up] : postpartum follow up [Delivery Date: ___] : on [unfilled] [] : delivered by vaginal delivery [Resumed Menses] : has not resumed her menses [Breastfeeding] : not currently nursing [Resumed Bracey] : has not resumed intercourse [None] : No associated symptoms are reported [Episiotomy Site Pain] : episiotomy site pain [Mild] : mild vaginal bleeding [Back to Normal] : is back to normal in size [Not Done] : Examination of breasts not done [Normal] : the vagina was normal [Healing Well] : is healing well [No Sign of Infection] : is showing no signs of infection [Doing Well] : is doing well [de-identified] : VAVD w/RML and partial 3rd degree laceration [Fair Pain Control] : has fair pain control [de-identified] : s/p RML w/VAVD; no signs of infection; recommend Naproxen OTC and ice packs locally; f/u in 2 weeks

## 2020-07-06 LAB — BACTERIA UR CULT: NORMAL

## 2020-07-14 ENCOUNTER — APPOINTMENT (OUTPATIENT)
Dept: OBGYN | Facility: CLINIC | Age: 25
End: 2020-07-14
Payer: COMMERCIAL

## 2020-07-14 VITALS
DIASTOLIC BLOOD PRESSURE: 60 MMHG | WEIGHT: 218 LBS | BODY MASS INDEX: 37.22 KG/M2 | HEIGHT: 64 IN | SYSTOLIC BLOOD PRESSURE: 104 MMHG

## 2020-07-14 PROCEDURE — 0503F POSTPARTUM CARE VISIT: CPT

## 2020-07-14 NOTE — HISTORY OF PRESENT ILLNESS
[Postpartum Follow Up] : postpartum follow up [Complications:___] : no complications [] : delivered by vaginal delivery [Delivery Date: ___] : on [unfilled] [Breastfeeding] : not currently nursing [Resumed Menses] : has resumed her menses [Intended Contraception] : Intended Contraception: [Resumed [place]] : has not resumed intercourse [S/Sx PP Depression] : no signs/symptoms of postpartum depression [Episiotomy Site Pain] : episiotomy site pain [Erythema] : not erythematous [Back to Normal] : is back to normal in size [Mild] : mild vaginal bleeding [Normal] : the vagina was normal [Healing Well] : is healing well [Not Done] : Examination of breasts not done [Cervix Sample Taken] : cervical sample not taken for a Pap smear [Doing Well] : is doing well [No Sign of Infection] : is showing no signs of infection [Fair Pain Control] : has fair pain control [None] : None [de-identified] : pelvic pain [de-identified] : RAQUEL w/RML [de-identified] : CMT and uterine tenderness [de-identified] : continue conservative management and Augmentin sent for presumed PP endometritis; f/u in 2 weeks

## 2020-07-28 ENCOUNTER — APPOINTMENT (OUTPATIENT)
Dept: OBGYN | Facility: CLINIC | Age: 25
End: 2020-07-28
Payer: COMMERCIAL

## 2020-07-28 VITALS
HEIGHT: 64 IN | SYSTOLIC BLOOD PRESSURE: 104 MMHG | BODY MASS INDEX: 37.56 KG/M2 | WEIGHT: 220 LBS | TEMPERATURE: 98 F | DIASTOLIC BLOOD PRESSURE: 60 MMHG

## 2020-07-28 PROCEDURE — 99213 OFFICE O/P EST LOW 20 MIN: CPT

## 2020-07-28 NOTE — PHYSICAL EXAM
[Alert] : alert [Acute Distress] : no acute distress [Awake] : awake [Nipple Discharge] : no nipple discharge [Mass] : no breast mass [Soft] : soft [Axillary LAD] : no axillary lymphadenopathy [Tender] : non tender [Oriented x3] : oriented to person, place, and time [Normal] : vagina [Uterine Adnexae] : were not tender and not enlarged [No Bleeding] : there was no active vaginal bleeding

## 2020-07-28 NOTE — ED ADULT TRIAGE NOTE - PRO INTERPRETER NEED 2

## 2020-09-26 ENCOUNTER — TRANSCRIPTION ENCOUNTER (OUTPATIENT)
Age: 25
End: 2020-09-26

## 2020-10-27 ENCOUNTER — APPOINTMENT (OUTPATIENT)
Dept: OBGYN | Facility: CLINIC | Age: 25
End: 2020-10-27

## 2020-11-21 NOTE — OB PROVIDER H&P - NSHPPHYSICALEXAM_GEN_ALL_CORE
abdomen soft, nontender  sse: negative pooling/nitrazine/ferning  sve: 1/30/-3/I  nst in progress
no vomiting/no melena/no nausea

## 2021-01-21 NOTE — DISCHARGE NOTE OB - FUNCTIONAL STATUS DATE
02-Jun-2020 Graft Donor Site Bandage (Optional-Leave Blank If You Don't Want In Note): Steri-strips and a pressure bandage were applied to the donor site.

## 2021-02-01 NOTE — ED ADULT TRIAGE NOTE - NS ED TRIAGE EKG
Routing refill request to provider for review/approval because:  Provider to review for the following information    ANTICOAGULATION  MANAGEMENT     Shannan Magdaleno is being discharged from the [facility] ([facility]).     Reason for discharge: warfarin therapy completed, Patient is on hospice and discontinued warfarin     Anticoagulation episode resolved     If patient needs warfarin management in the future, please send a new referral      JUAN Page RN    [facility]            EKG completed

## 2021-03-06 NOTE — OB PROVIDER H&P - NS_FETALMONCTXPAT_OBGYN_ALL_OB
PT AOX4. PT REPORTS CHEST PAIN RADIATING TO RIGHT SHOULDER. PAIN NOTED TO
SUBSIDE WHEN OXYGEN APPLIED. PT REPORTS CHEST PAIN ALWAYS GETS BETTER WHEN
OXYGEN USED. PT RECEIVED LAST DOSE OF PRN IV TORADOL Q8HR WITH POSITIVE
EFFECT. PT ALSO RECEIVING PRN PO APAP Q4HR. PT REPORTS SOB WITH EXERTION WHILE
ALTERNATING BETWEEN ROOM AIR AND 2L. PROVIDED EDUCATION TO PT IN REGARDS TO
EFFECTIVENESS OF O2 USE WHEN EXPERIENCING SOB AND PER DISEASE PROCESS, PT
RECEPTIVE TO EDUCATION. PT REPORTS CONCERN WITH FAMILY MEDICAL HISTORY OF CHF
CAUSING DEMISE OF HER FATHER. PT REQUESTING FOLLOW UP WITH CARDIOLOGY DUE TO
INCREASED AND REPITITIVE OCCURENCE OF CHEST PAIN. PROVIDED REASSURANCE TO PT
THAT PROVIDERS WILL BE NOTIFIED PRIOR TO END OF SHIFT. PT TOLERATING PO INTAKE
OF FLUIDS AND REGULAR DIET WITHOUT ISSUE. PT WITHOUT NAUSEA OR EMESIS. PT
AMBULATING WITH STANDBY ASSIST TO BATHROOM AND BEDSIDE COMMODE, RESTING IN
BED OTHERWISE. FREQUENT REPOSITIONING ENCOURAGED WHILE IN BED, PT NOTED TO
SHIFT INDEPENDENTLY WHILE IN BED, HEAD OF BED REMAINS ELEVATED. PT TOOK
SHOWER AT HS WITH REPORTS OF SOB ON ROOM AIR, CONTINUOUS O2 USE ENCOURAGED. PT
ENCOURAGED TO NOTIFY STAFF FOR ALL NEEDS, CALL LIGHT WITHIN REACH, BED ALARM
ON, BED LOCKED IN LOWEST POSITION, FREQUENT MONITORING WILL CONTINUE. No Contractions

## 2021-06-22 ENCOUNTER — APPOINTMENT (OUTPATIENT)
Dept: OBGYN | Facility: CLINIC | Age: 26
End: 2021-06-22
Payer: MEDICAID

## 2021-06-22 VITALS
WEIGHT: 210 LBS | BODY MASS INDEX: 35.85 KG/M2 | SYSTOLIC BLOOD PRESSURE: 114 MMHG | HEIGHT: 64 IN | DIASTOLIC BLOOD PRESSURE: 74 MMHG

## 2021-06-22 PROCEDURE — 99395 PREV VISIT EST AGE 18-39: CPT | Mod: 25

## 2021-06-22 RX ORDER — NORGESTIMATE AND ETHINYL ESTRADIOL 0.25-0.035
0.25-35 KIT ORAL DAILY
Qty: 3 | Refills: 3 | Status: COMPLETED | COMMUNITY
Start: 2020-07-31 | End: 2021-06-22

## 2021-06-22 NOTE — COUNSELING
[Nutrition/ Exercise/ Weight Management] : nutrition, exercise, weight management [Vitamins/Supplements] : vitamins/supplements [STD (testing, results, tx)] : STD (testing, results, tx) [Contraception/ Emergency Contraception/ Safe Sexual Practices] : contraception, emergency contraception, safe sexual practices [FreeTextEntry2] : Vulvar Hygiene

## 2021-06-22 NOTE — PHYSICAL EXAM
[Appropriately responsive] : appropriately responsive [Alert] : alert [No Acute Distress] : no acute distress [Soft] : soft [Non-tender] : non-tender [Non-distended] : non-distended [No HSM] : No HSM [No Lesions] : no lesions [No Mass] : no mass [Oriented x3] : oriented x3 [Examination Of The Breasts] : a normal appearance [No Masses] : no breast masses were palpable [Labia Minora] : normal [Labia Majora] : normal [Normal] : normal [Uterine Adnexae] : normal [FreeTextEntry4] : point tenderness at posterior fourchette along laceration repair

## 2021-06-22 NOTE — DISCUSSION/SUMMARY
[FreeTextEntry1] : - Pap obtained today\par - STI testing offered; declined\par - Pelvic Pt referral given for dyspareunia\par - will change OCP secondary to mood lability\par - vaginal estrogen sent for scar discomfort

## 2021-06-22 NOTE — HISTORY OF PRESENT ILLNESS
[Currently Active] : currently active [Men] : men [Vaginal] : vaginal [No] : No [Yes] : Patient has concerns regarding sex [FreeTextEntry1] : dyspareunia

## 2021-06-28 LAB — CYTOLOGY CVX/VAG DOC THIN PREP: NORMAL

## 2021-08-24 ENCOUNTER — APPOINTMENT (OUTPATIENT)
Dept: OBGYN | Facility: CLINIC | Age: 26
End: 2021-08-24

## 2021-11-03 NOTE — OB PROVIDER H&P - NS_FHRACCEL_OBGYN_ALL_OB
ASPIRIN and NSAID PRODUCTS    The following is a list of medications that either contain aspirin or nonsteroidal anti-inflammatory agents (NSAID's).     OVER-THE-COUNTER:  Do not take five (5) days prior to procedure.  Do not take for two (2) weeks after procedure unless otherwise instructed.  · Aspirin (generic):  Brand Names:  Anacin, Marcia, Sidney, Aspergum, Aspercin, Aspermin, Aspertab, Back-quell, Duradyne, Empirin, Gemnisyn, Genprin, Gensan, Magnaprin, McNess Pain Tab, Momentum, P-A-C, Pain Reliever Tabs, Tri-Pain Caplets, Vanquish Caplet.  · Buffered Aspirin (generic):  Brand Names:  Ascriptin, Bufferin, Ecotrin, Buffaprin, Buffasal, Buffinol, COPE.  · BC Powders/Tablets  · Goody's Powders  · Stanback Powders or Tablets  · Excedrin, Excedrin Extra, Excedrin IB  · Laureen [place] or Bromo-Plainfield  · Ibuprofen (generic):  Brand Names:  Advil, Nuprin, Motrin IB, Adapin, Genpril, Ibufen 200, Menadol, Midol IB, Dristan Sinus, Ursinus Inlay Tabs, Dimetapp Sinus, Valparin, Haltran Tabs, Karlo's Pills, Nate.  · Aspirin Suppositories (generic, any strength)  · Naproxen (generic)  Brand Name: Aleve  · Pepto Bismol    PRESCRIPTION:  Brand Name Generic Name    Fiorinal  butalbital, aspirin, caffeine    Naprosyn, Anaprox  naproxen    Voltaren, Cataflam  diclofenac    Feldene  piroxicam    Motrin (Rx), Rufen  ibuprofen    Ansaid  flurbiprofen    Orudis  ketoprofen    Dolobid  diflunisal    Clinoril  sulindac    Indocin  indomethacin    Tolectin  tolmetin    Azdone Tabs  aspirin plus hydrocodone    Percodan  aspirin plus oxycodone    Synalgos  aspirin plus dihydrocodeine    Daypro  oxaprozin    Lodine  etodolac    Mobic  meloxicam    Meclomen  meclofenamate    Nalfon  fenoprofen    Ponstel (mefenamic acid)     Relafen  nabumetone    Toradol  ketorolac     If you have a headache, backache, arthritis or other painful condition, please take Tylenol, Datril or some other non aspirin-containing product.       Interactions with  Herbs and Dietary Supplements      Ginkgo biloba    Garlic    Saw palmetto    Alfalfa    American ginseng    Nancy    Anise    Arnica montana    Asafetida    Waverly bark    Bilberry    Birch    Black cohosh    Bladderwrack    Bogbean    Boldo    Borage seed oil    Bromelain    Capsicum    Cat's claw    Celery     Chamomile    Alamo    Clove    Coleus    Cordyceps       Dandelion     Danshen    Devil's claw    Dong quai    EPA (eicosapentaenoic    acid, found in fish oils)    Evening primrose oil    Fenugreek    Feverfew    Fish oil    Flaxseed/flax powder (not a    concern with flaxseed oil)    Jacquelin    Grapefruit juice    Grapeseed    Green tea    Guggul    Gymnestra    Horse chestnut    Horseradish    Licorie root    Lovage root    Male fern    Jessa    Melatonin    Nordihydroguairetic acid    (NDGA)   Omega-3 fatty acids    Onion    Papain    Panax ginseng    Parsley    Passionflower    Poplar    Prickly lizeth    Propolis    Quassia    Red clover    Reishi    Siberian ginseng    Sweet clover    Rue    Sweet birch    Turmeric    Vitamin E    White willow    Wild carrot    Wild lettuce    Laconia    [place]    [place]           Present (15 x15 bpm)

## 2022-04-17 ENCOUNTER — EMERGENCY (EMERGENCY)
Facility: HOSPITAL | Age: 27
LOS: 1 days | Discharge: HOME | End: 2022-04-17
Attending: STUDENT IN AN ORGANIZED HEALTH CARE EDUCATION/TRAINING PROGRAM | Admitting: STUDENT IN AN ORGANIZED HEALTH CARE EDUCATION/TRAINING PROGRAM
Payer: COMMERCIAL

## 2022-04-17 ENCOUNTER — EMERGENCY (EMERGENCY)
Facility: HOSPITAL | Age: 27
LOS: 1 days | Discharge: HOME | End: 2022-04-17
Attending: STUDENT IN AN ORGANIZED HEALTH CARE EDUCATION/TRAINING PROGRAM | Admitting: EMERGENCY MEDICINE
Payer: COMMERCIAL

## 2022-04-17 VITALS
HEIGHT: 64 IN | SYSTOLIC BLOOD PRESSURE: 161 MMHG | TEMPERATURE: 99 F | DIASTOLIC BLOOD PRESSURE: 69 MMHG | HEART RATE: 101 BPM | RESPIRATION RATE: 18 BRPM | OXYGEN SATURATION: 100 %

## 2022-04-17 VITALS
TEMPERATURE: 99 F | SYSTOLIC BLOOD PRESSURE: 150 MMHG | HEIGHT: 64 IN | DIASTOLIC BLOOD PRESSURE: 73 MMHG | OXYGEN SATURATION: 100 % | HEART RATE: 108 BPM | RESPIRATION RATE: 17 BRPM

## 2022-04-17 VITALS
RESPIRATION RATE: 18 BRPM | SYSTOLIC BLOOD PRESSURE: 125 MMHG | TEMPERATURE: 98 F | HEART RATE: 93 BPM | DIASTOLIC BLOOD PRESSURE: 71 MMHG | OXYGEN SATURATION: 100 %

## 2022-04-17 DIAGNOSIS — Z98.890 OTHER SPECIFIED POSTPROCEDURAL STATES: Chronic | ICD-10-CM

## 2022-04-17 LAB
ALBUMIN SERPL ELPH-MCNC: 4.7 G/DL — SIGNIFICANT CHANGE UP (ref 3.3–5)
ALP SERPL-CCNC: 60 U/L — SIGNIFICANT CHANGE UP (ref 40–120)
ALT FLD-CCNC: 37 U/L — HIGH (ref 4–33)
ANION GAP SERPL CALC-SCNC: 15 MMOL/L — HIGH (ref 7–14)
APPEARANCE UR: CLEAR — SIGNIFICANT CHANGE UP
AST SERPL-CCNC: 34 U/L — HIGH (ref 4–32)
BACTERIA # UR AUTO: NEGATIVE — SIGNIFICANT CHANGE UP
BASOPHILS # BLD AUTO: 0.07 K/UL — SIGNIFICANT CHANGE UP (ref 0–0.2)
BASOPHILS NFR BLD AUTO: 0.6 % — SIGNIFICANT CHANGE UP (ref 0–2)
BILIRUB SERPL-MCNC: 0.3 MG/DL — SIGNIFICANT CHANGE UP (ref 0.2–1.2)
BILIRUB UR-MCNC: NEGATIVE — SIGNIFICANT CHANGE UP
BUN SERPL-MCNC: 8 MG/DL — SIGNIFICANT CHANGE UP (ref 7–23)
CALCIUM SERPL-MCNC: 9.5 MG/DL — SIGNIFICANT CHANGE UP (ref 8.4–10.5)
CHLORIDE SERPL-SCNC: 104 MMOL/L — SIGNIFICANT CHANGE UP (ref 98–107)
CO2 SERPL-SCNC: 22 MMOL/L — SIGNIFICANT CHANGE UP (ref 22–31)
COLOR SPEC: SIGNIFICANT CHANGE UP
CREAT SERPL-MCNC: 0.57 MG/DL — SIGNIFICANT CHANGE UP (ref 0.5–1.3)
DIFF PNL FLD: ABNORMAL
EGFR: 128 ML/MIN/1.73M2 — SIGNIFICANT CHANGE UP
EOSINOPHIL # BLD AUTO: 0.25 K/UL — SIGNIFICANT CHANGE UP (ref 0–0.5)
EOSINOPHIL NFR BLD AUTO: 2.1 % — SIGNIFICANT CHANGE UP (ref 0–6)
EPI CELLS # UR: 2 /HPF — SIGNIFICANT CHANGE UP (ref 0–5)
GLUCOSE SERPL-MCNC: 80 MG/DL — SIGNIFICANT CHANGE UP (ref 70–99)
GLUCOSE UR QL: NEGATIVE — SIGNIFICANT CHANGE UP
HCG SERPL-ACNC: 1986 MIU/ML — SIGNIFICANT CHANGE UP
HCT VFR BLD CALC: 41.7 % — SIGNIFICANT CHANGE UP (ref 34.5–45)
HGB BLD-MCNC: 13.9 G/DL — SIGNIFICANT CHANGE UP (ref 11.5–15.5)
HYALINE CASTS # UR AUTO: 1 /LPF — SIGNIFICANT CHANGE UP (ref 0–7)
IANC: 8.51 K/UL — HIGH (ref 1.8–7.4)
IMM GRANULOCYTES NFR BLD AUTO: 0.4 % — SIGNIFICANT CHANGE UP (ref 0–1.5)
KETONES UR-MCNC: NEGATIVE — SIGNIFICANT CHANGE UP
LEUKOCYTE ESTERASE UR-ACNC: NEGATIVE — SIGNIFICANT CHANGE UP
LIDOCAIN IGE QN: 25 U/L — SIGNIFICANT CHANGE UP (ref 7–60)
LYMPHOCYTES # BLD AUTO: 18.9 % — SIGNIFICANT CHANGE UP (ref 13–44)
LYMPHOCYTES # BLD AUTO: 2.25 K/UL — SIGNIFICANT CHANGE UP (ref 1–3.3)
MCHC RBC-ENTMCNC: 29.7 PG — SIGNIFICANT CHANGE UP (ref 27–34)
MCHC RBC-ENTMCNC: 33.3 GM/DL — SIGNIFICANT CHANGE UP (ref 32–36)
MCV RBC AUTO: 89.1 FL — SIGNIFICANT CHANGE UP (ref 80–100)
MONOCYTES # BLD AUTO: 0.78 K/UL — SIGNIFICANT CHANGE UP (ref 0–0.9)
MONOCYTES NFR BLD AUTO: 6.5 % — SIGNIFICANT CHANGE UP (ref 2–14)
NEUTROPHILS # BLD AUTO: 8.51 K/UL — HIGH (ref 1.8–7.4)
NEUTROPHILS NFR BLD AUTO: 71.5 % — SIGNIFICANT CHANGE UP (ref 43–77)
NITRITE UR-MCNC: NEGATIVE — SIGNIFICANT CHANGE UP
NRBC # BLD: 0 /100 WBCS — SIGNIFICANT CHANGE UP
NRBC # FLD: 0 K/UL — SIGNIFICANT CHANGE UP
PH UR: 7 — SIGNIFICANT CHANGE UP (ref 5–8)
PLATELET # BLD AUTO: 384 K/UL — SIGNIFICANT CHANGE UP (ref 150–400)
POTASSIUM SERPL-MCNC: 4.4 MMOL/L — SIGNIFICANT CHANGE UP (ref 3.5–5.3)
POTASSIUM SERPL-SCNC: 4.4 MMOL/L — SIGNIFICANT CHANGE UP (ref 3.5–5.3)
PROT SERPL-MCNC: 7.8 G/DL — SIGNIFICANT CHANGE UP (ref 6–8.3)
PROT UR-MCNC: NEGATIVE — SIGNIFICANT CHANGE UP
RBC # BLD: 4.68 M/UL — SIGNIFICANT CHANGE UP (ref 3.8–5.2)
RBC # FLD: 12.9 % — SIGNIFICANT CHANGE UP (ref 10.3–14.5)
RBC CASTS # UR COMP ASSIST: 1 /HPF — SIGNIFICANT CHANGE UP (ref 0–4)
SODIUM SERPL-SCNC: 141 MMOL/L — SIGNIFICANT CHANGE UP (ref 135–145)
SP GR SPEC: 1.01 — SIGNIFICANT CHANGE UP (ref 1–1.05)
UROBILINOGEN FLD QL: SIGNIFICANT CHANGE UP
WBC # BLD: 11.91 K/UL — HIGH (ref 3.8–10.5)
WBC # FLD AUTO: 11.91 K/UL — HIGH (ref 3.8–10.5)
WBC UR QL: 1 /HPF — SIGNIFICANT CHANGE UP (ref 0–5)

## 2022-04-17 PROCEDURE — 99284 EMERGENCY DEPT VISIT MOD MDM: CPT

## 2022-04-17 PROCEDURE — 76830 TRANSVAGINAL US NON-OB: CPT | Mod: 26

## 2022-04-17 PROCEDURE — 99285 EMERGENCY DEPT VISIT HI MDM: CPT

## 2022-04-17 RX ORDER — SODIUM CHLORIDE 9 MG/ML
1000 INJECTION INTRAMUSCULAR; INTRAVENOUS; SUBCUTANEOUS ONCE
Refills: 0 | Status: COMPLETED | OUTPATIENT
Start: 2022-04-17 | End: 2022-04-17

## 2022-04-17 RX ADMIN — SODIUM CHLORIDE 1000 MILLILITER(S): 9 INJECTION INTRAMUSCULAR; INTRAVENOUS; SUBCUTANEOUS at 17:16

## 2022-04-17 NOTE — ED PROVIDER NOTE - OBJECTIVE STATEMENT
Hermann RIVERS: 27F known by me, seen by me and RAMÍREZ Lane earlier in same day, no pmh  ~7w preg w no conf IUP yet re presents to ED w a cc of heavy vaginal bleeding a/w passage of large clot prior to arrival. She is concerned she may be having a miscarriage. No other new complaints on interval.

## 2022-04-17 NOTE — ED PROVIDER NOTE - PROGRESS NOTE DETAILS
OBGYN evaluated the patient, patient ok for dc, advised to call her OBGYN tomorrow for f/u for HCG and Ultrasound in 48 hours.

## 2022-04-17 NOTE — ED PROVIDER NOTE - NS ED ATTENDING STATEMENT MOD
This was a shared visit with the CLEMENTE. I reviewed and verified the documentation and independently performed the documented:

## 2022-04-17 NOTE — CONSULT NOTE ADULT - SUBJECTIVE AND OBJECTIVE BOX
MARGARET SANTACRUZ  27y  Female [id number]    26 y/o  LMP  @7w4d returning to the ED with heavier bleeding. Patient seen earlier today complaining of abdominal cramping and vaginal spotting, diagnosed with PUL with gestational sac measuring 5 weeks.  Patient went home and had an episode of heavier bleeding and passed a grapefruit sized clots. She also noted passage of a small sac. Concerned about her bleeding she returned to the ED. Only used 1 pad. She denies any lightheadedness, dizziness, severe abdominal pain, nausea, or vomiting. No other complaints.     Name of GYN Physician: Dr. Olguin    POB:  x 1 (FT)  Pgyn: Denies fibroids, cysts, endometriosis, STI's, Abnormal pap smears   PMH: denies  PSH: knee surgery  Meds: denies  All: NKDA  SH: Denies smoking use, drug use, alcohol use    Vital Signs Last 24 Hrs  T(C): 37 (2022 21:14), Max: 37.1 (2022 14:54)  T(F): 98.6 (2022 21:14), Max: 98.8 (2022 14:54)  HR: 108 (2022 21:14) (93 - 108)  BP: 150/73 (2022 21:14) (125/71 - 161/69)  BP(mean): --  RR: 17 (2022 21:14) (17 - 18)  SpO2: 100% (2022 21:14) (100% - 100%)    Physical Exam:   General: Tearful, no NAD  CV: RRR  Lungs: CTA b/l  Abd: Soft, non-tender, non-distended.   :  Pad saturated. External labia wnl.  Bimanual exam with no cervical motion tenderness, uterus wnl, adnexa non palpable b/l.  Cervix dilated to 0.5cm.  Speculum Exam: Slow bleeding noted from cervical os. Small clot evacuated from vaginal vault.     Ext: non-tender b/l

## 2022-04-17 NOTE — CONSULT NOTE ADULT - ASSESSMENT
26 y/o  LMP  @7w4d with PUL returning to the ED with heavier bleeding. VSS. No evidence of heavy vaginal bleeding on exam. Clinically consistent with SAB.   - repeat TVUS    D/w Dr. Burns 26 y/o  LMP  @7w4d with PUL returning to the ED with heavier bleeding. VSS. No evidence of heavy vaginal bleeding on exam. Clinically consistent with SAB.   - repeat TVUS    D/w Dr. Burns    Addendum: Pt requesting to leave prior to US. Reports bleeding is light, has not needed to change a pad while in the ED. Recommend close outpatient follow up. Bleeding precautions given.    Rosa Hinkle, PGY-2

## 2022-04-17 NOTE — ED ADULT NURSE NOTE - OBJECTIVE STATEMENT
Pt. is a 27 y.o female who presents w/ /co vaginal bleeding. Pt. A&Ox4 and ambulatory. Pt. was seen earlier today for same complaint-states the bleeding is getting worse. Chaperoned pelvic exam w/ OBGYN Resident who removed multiple large clots from the vagina. Per OB Resident- bleeding has increased since she saw pt earlier. Pt. endorsing pain to the lower abdomen on palpation. Pt. denying IV insert at this time.  NAD. Comfort measures provided, safety measures implemented, call bell in reach. Pending US.

## 2022-04-17 NOTE — ED PROVIDER NOTE - OBJECTIVE STATEMENT
Hermann RIVERS: 27F no pmh  ~7w preg w no conf IUP yet presents with a cc of x2 days lower abdominal cramping that is not as severe as menstrual cycles a/w vaginal spotting that is getting worse prompting ED visit, has been soaking panty liners but thinks is increasing, no other concerning vaginal discharge no urinary sx, no fever. No blood in urine or stool. Denies n/v/f/c/cp/sob. Denies headache, syncope, lightheadedness, dizziness. Denies chest palpitations, Denies edema. Denies dysuria, hematuria, BRBPR, tarry stools, diarrhea, constipation. Declines offer for APAP during initial assessment.         OBGYN: Clau

## 2022-04-17 NOTE — ED PROVIDER NOTE - PATIENT PORTAL LINK FT
You can access the FollowMyHealth Patient Portal offered by [facility] by registering at the following website: [email or web address]. By joining [employer] [facility]’s FollowMyHealth portal, you will also be able to view your health information using other applications (apps) compatible with our system.

## 2022-04-17 NOTE — CONSULT NOTE ADULT - ASSESSMENT
26 y/o  LMP  @7w4d presenting with cramping and vaginal spotting. Clinically stable.  [facility] [place] . TVUS with gestational alan measuring 5w, no yolk sac, no fetal pole.   -No acute GYN intervention required at this time  -Discussed with patient that this pregnancy may represent an early pregnancy, a non-viable pregnancy, or an ectopic pregnancy  -Given the morbidity associated w/ ectopic pregnancy, we will follow the patient closely to trend the b-hcg levels and obtain repeat US as indicated; counseled the patient extensively re: the importance of follow-up  -Patient to return in 48 hours for a repeat b-hcg/sono with Dr. Olguin  -Rh+, no rhogam indicated  -Precautions given  -All questions answered to the apparent satisfaction of the patient    Patient seen and d/w Dr. Katy Hinkle, PGY-2

## 2022-04-17 NOTE — ED PROVIDER NOTE - PROGRESS NOTE DETAILS
patient is declining ultrasound that this time, stating that she is scheduled to f/u with her OB in the morning and is going to have a tvus perform. she is requesting to be d/c. Sign out follow-up: Pt requesting to go home without ultrasound. No pain. Pt reports some bleeding but has not needed to change pad during this visit. No lightheadedness. Pt will f/u with her Ob/gyn. Return precautions discussed. RM

## 2022-04-17 NOTE — ED PROVIDER NOTE - CLINICAL SUMMARY MEDICAL DECISION MAKING FREE TEXT BOX
Hermann RIVERS: 27F no pmh  ~7w preg w no conf IUP yet presents with a cc of x2 days lower abdominal cramping that is not as severe as menstrual cycles a/w vaginal spotting that is getting worse prompting ED visit, has been soaking panty liners but thinks is increasing, no other concerning vaginal discharge no urinary sx, no fever. No blood in urine or stool. exam vss non toxic well appearing vitals stable ddx c/f threat  of other complication of preg, will check labs t/s tvus urine meds as needed and reassess thereafter.

## 2022-04-17 NOTE — ED PROVIDER NOTE - NSFOLLOWUPINSTRUCTIONS_ED_ALL_ED_FT
Follow up with your OBGYN call tomorrow to make an appointment within 48 hours for repeat blood work ( HCG) and ultrasound     If results or reports were given to you, show copies of your reports given to you.     Take all of your medications as previously prescribed.    If you have issues obtaining follow up, please call: [phone] to obtain a doctor or specialist who takes your insurance in your area.    If any worsening pain, vomiting, heavy vaginal bleeding ( soaking through >2 pads per hour for 3 consecutive hours) return to the ER

## 2022-04-17 NOTE — ED PROVIDER NOTE - CLINICAL SUMMARY MEDICAL DECISION MAKING FREE TEXT BOX
Patient states she is feeling better, continues to point to the center of chest when questioned if the pain is gone.    MD order for Toradol received, med administered. Vital signs stable, order for discharge received.   Hermann RIVERS: 27F known by me, seen by me and RAMÍREZ Lane earlier in same day, no pmh  ~7w preg w no conf IUP yet re presents to ED w a cc of heavy vaginal bleeding a/w passage of large clot prior to arrival.  exam tachycardic visibly upset w obvious vaginal bleeding, c/f likely active miscarriage, discussed with OB, saw pt earlier today, will re eval pt, was offered pelvic, will defer to OB for now, meds and transfusion as indicated. repeat sono.

## 2022-04-17 NOTE — ED PROVIDER NOTE - PHYSICAL EXAMINATION
Hermann RIVERS:  VITALS: Initial triage and subsequent vitals have been reviewed by me.  GEN APPEARANCE: WDWN, alert and cooperative, non-toxic appearing, tearful on arrival, active vaginal bleeding down legs and on stretcher   HEAD: Atraumatic, normocephalic   EYES: PERRLa, EOMI, vision grossly intact.   EARS: Gross hearing intact.   NOSE: No nasal discharge, no external evidence of epistaxis.   NECK: Supple  CV: RRR, S1S2, no c/r/m/g. No cyanosis or pallor. Extremities warm, well perfused. Cap refill <2 seconds. No bruits.   LUNGS: CTAB. No wheezing. No rales. No rhonchi. No diminished breath sounds.   ABDOMEN: Soft, NTND. No guarding or rebound. No masses.   MSK/EXT: Spine appears normal, no spine point tenderness. No CVA ttp. Normal muscular development. Pelvis stable. No obvious joint or bony deformity, no peripheral edema.   NEURO: Alert, follows commands. Weight bearing normal. Speech normal. Sensation and motor normal x4 extremities.   SKIN: Normal color for race, warm, dry and intact. No evidence of rash.  PSYCH: Normal mood and affect.

## 2022-04-17 NOTE — ED ADULT TRIAGE NOTE - CHIEF COMPLAINT QUOTE
Pt returns to ED after leaving 30 minutes ago. Pt was told she's 5 weeks pregnant and was having bright red spotting.  Pt returns due to bleeding increasing and reports passing a large clot.

## 2022-04-17 NOTE — ED PROVIDER NOTE - ATTENDING CONTRIBUTION TO CARE
I have personally performed a face to face medical and diagnostic evaluation of the patient. I have discussed with and reviewed the CLEMENTE's note and agree with the History, ROS, Physical Exam and MDM unless otherwise indicated. A brief summary of my personal evaluation and impression can be found below.    Hermann RIVERS: Please see the HPI, ROS, PE and MDM as authored by me.

## 2022-04-17 NOTE — CONSULT NOTE ADULT - SUBJECTIVE AND OBJECTIVE BOX
MARGARET SANTACRUZ  27y  Female [id number]    HPI:  26 y/o  LMP  @7w4d presenting with cramping and vaginal spotting. Patient states that she had a positive UPT 3 weeks ago and scheduled an appointment with Dr. Olguin in early April. She states that yesterday she developed abdominal cramping and vaginal spotting yesterday and continued until today. Used 1 pad during the day. She states that she was concerned about the spotting and presented to the ED for evaluation. Denies any heavy vaginal bleeding, severe abdominal pain, F/C, nausea, vomiting, urinary symptoms, dizziness, lightheadedness, or any other complaints. This is an unplanned but desired pregnancy.       Name of GYN Physician: Dr. Olguin    POB:  x 1 (FT)  Pgyn: Denies fibroids, cysts, endometriosis, STI's, Abnormal pap smears   PMH: denies  PSH: knee surgery  Meds: denies  All: NKDA  SH: Denies smoking use, drug use, alcohol use    Vital Signs Last 24 Hrs  T(C): 37 (2022 21:14), Max: 37.1 (2022 14:54)  T(F): 98.6 (2022 21:14), Max: 98.8 (2022 14:54)  HR: 108 (2022 21:14) (93 - 108)  BP: 150/73 (2022 21:14) (125/71 - 161/69)  BP(mean): --  RR: 17 (2022 21:14) (17 - 18)  SpO2: 100% (2022 21:14) (100% - 100%)    Physical Exam:   General: sitting comfortably in bed  CV: RRR  Lungs: CTA  Abd: Soft, non-tender, non-distended.  :  Light bleeding on pad.  External labia wnl.  Bimanual exam with no cervical motion tenderness, uterus wnl, adnexa non palpable b/l.  Cervix closed  Speculum Exam: No active bleeding from os. Scant vaginal bleeding in the vault.  Physiologic discharge.    Ext: non-tender b/l, no edema     LABS:    HCG Quantitative, Serum: 1986.0 mIU/mL (22 @ 16:48)                      13.9   11.91 )-----------( 384      ( 2022 16:50 )             41.7         141  |  104  |  8   ----------------------------<  80  4.4   |  22  |  0.57    Ca    9.5      2022 16:48    TPro  7.8  /  Alb  4.7  /  TBili  0.3  /  DBili  x   /  AST  34<H>  /  ALT  37<H>  /  AlkPhos  60      I&O's Detail      Urinalysis Basic - ( 2022 16:48 )    Color: Light Yellow / Appearance: Clear / S.015 / pH: x  Gluc: x / Ketone: Negative  / Bili: Negative / Urobili: <2 mg/dL   Blood: x / Protein: Negative / Nitrite: Negative   Leuk Esterase: Negative / RBC: 1 /HPF / WBC 1 /HPF   Sq Epi: x / Non Sq Epi: 2 /HPF / Bacteria: Negative        RADIOLOGY & ADDITIONAL STUDIES:  < from: US Transvaginal (22 @ 16:55) >  INTERPRETATION:  CLINICAL INFORMATION: 27-year-old female approximately 7   weeks pregnant without confirmed pregnancy. 2 days lower abdominal   cramping and vaginal spotting    LMP: 2022    COMPARISON: None available.    TECHNIQUE:  Endovaginal pelvic sonogram only.  Uterus:  Single intrauterine gestation.  Gestational sac 6 mm. Equivalent to approximately 5 weeks.  No embryo or yolk sac      Right adnexa: Within normal limits Left adnexa: Within normal limits.  Free fluid: None    IMPRESSION:    Single intrauterine gestational sac corresponding to approximately 5   weeks gestational age. No embryo or yolk sac present. Recommend follow-up   in one week      --- End of Report ---    < end of copied text >

## 2022-04-17 NOTE — ED PROVIDER NOTE - NSFOLLOWUPINSTRUCTIONS_ED_ALL_ED_FT
Follow-up with your ob/gyn within 24-48 hours. Call office for appointment.    Seek medical attention if you have significant pain, large blood loss, feel dizzy, weak or lightheaded, or if you have any concerns.

## 2022-04-17 NOTE — ED PROVIDER NOTE - PHYSICAL EXAMINATION
Hermann RIVERS:  VITALS: Initial triage and subsequent vitals have been reviewed by me.  GEN APPEARANCE: WDWN, alert and cooperative, non-toxic appearing and in NAD  HEAD: Atraumatic, normocephalic   EYES: PERRLa, EOMI, vision grossly intact.   EARS: Gross hearing intact.   NOSE: No nasal discharge, no external evidence of epistaxis.   NECK: Supple  CV: RRR, S1S2, no c/r/m/g. No cyanosis or pallor. Extremities warm, well perfused. Cap refill <2 seconds. No bruits.   LUNGS: CTAB. No wheezing. No rales. No rhonchi. No diminished breath sounds.   ABDOMEN: Soft, NTND. No guarding or rebound. No masses.   MSK/EXT: Spine appears normal, no spine point tenderness. No CVA ttp. Normal muscular development. Pelvis stable. No obvious joint or bony deformity, no peripheral edema.   NEURO: Alert, follows commands. Weight bearing normal. Speech normal. Sensation and motor normal x4 extremities.   SKIN: Normal color for race, warm, dry and intact. No evidence of rash.  PSYCH: Normal mood and affect.

## 2022-04-18 ENCOUNTER — NON-APPOINTMENT (OUTPATIENT)
Age: 27
End: 2022-04-18

## 2022-04-19 LAB
CULTURE RESULTS: SIGNIFICANT CHANGE UP
SPECIMEN SOURCE: SIGNIFICANT CHANGE UP

## 2022-04-20 ENCOUNTER — NON-APPOINTMENT (OUTPATIENT)
Age: 27
End: 2022-04-20

## 2022-04-20 ENCOUNTER — APPOINTMENT (OUTPATIENT)
Dept: OBGYN | Facility: CLINIC | Age: 27
End: 2022-04-20
Payer: COMMERCIAL

## 2022-04-20 ENCOUNTER — RESULT CHARGE (OUTPATIENT)
Age: 27
End: 2022-04-20

## 2022-04-20 VITALS
HEIGHT: 64 IN | SYSTOLIC BLOOD PRESSURE: 124 MMHG | WEIGHT: 231 LBS | BODY MASS INDEX: 39.44 KG/M2 | DIASTOLIC BLOOD PRESSURE: 58 MMHG

## 2022-04-20 LAB
BILIRUB UR QL STRIP: NORMAL
GLUCOSE UR-MCNC: NORMAL
HCG UR QL: 0.2 EU/DL
HCG UR QL: POSITIVE
HGB UR QL STRIP.AUTO: NORMAL
KETONES UR-MCNC: NORMAL
LEUKOCYTE ESTERASE UR QL STRIP: NORMAL
NITRITE UR QL STRIP: NORMAL
PH UR STRIP: 6.5
PROT UR STRIP-MCNC: NORMAL
SP GR UR STRIP: 1.01

## 2022-04-20 PROCEDURE — 99213 OFFICE O/P EST LOW 20 MIN: CPT | Mod: 25

## 2022-04-20 PROCEDURE — 76830 TRANSVAGINAL US NON-OB: CPT

## 2022-04-20 NOTE — HISTORY OF PRESENT ILLNESS
[FreeTextEntry1] : 28yo  LMP 21 here w/missed menses and positive urine pregnancy test. Pt reporting vaginal bleeding with passage of clots 3 days ago c/w compelte miscarriage.

## 2022-04-22 LAB
BACTERIA UR CULT: NORMAL
C TRACH RRNA SPEC QL NAA+PROBE: NOT DETECTED
N GONORRHOEA RRNA SPEC QL NAA+PROBE: NOT DETECTED
SOURCE AMPLIFICATION: NORMAL
SOURCE AMPLIFICATION: NORMAL
T VAGINALIS RRNA SPEC QL NAA+PROBE: NOT DETECTED

## 2022-08-09 ENCOUNTER — APPOINTMENT (OUTPATIENT)
Dept: OBGYN | Facility: CLINIC | Age: 27
End: 2022-08-09

## 2022-08-09 VITALS
SYSTOLIC BLOOD PRESSURE: 112 MMHG | BODY MASS INDEX: 40.97 KG/M2 | HEIGHT: 64 IN | WEIGHT: 240 LBS | DIASTOLIC BLOOD PRESSURE: 60 MMHG

## 2022-08-09 DIAGNOSIS — Z87.448 PERSONAL HISTORY OF OTHER DISEASES OF URINARY SYSTEM: ICD-10-CM

## 2022-08-09 DIAGNOSIS — Z34.02 ENCOUNTER FOR SUPERVISION OF NORMAL FIRST PREGNANCY, SECOND TRIMESTER: ICD-10-CM

## 2022-08-09 DIAGNOSIS — Z34.01 ENCOUNTER FOR SUPERVISION OF NORMAL FIRST PREGNANCY, FIRST TRIMESTER: ICD-10-CM

## 2022-08-09 DIAGNOSIS — R07.89 OTHER CHEST PAIN: ICD-10-CM

## 2022-08-09 DIAGNOSIS — O03.9 COMPLETE OR UNSPECIFIED SPONTANEOUS ABORTION W/OUT COMPLICATION: ICD-10-CM

## 2022-08-09 DIAGNOSIS — Z3A.36 36 WEEKS GESTATION OF PREGNANCY: ICD-10-CM

## 2022-08-09 DIAGNOSIS — Z3A.32 32 WEEKS GESTATION OF PREGNANCY: ICD-10-CM

## 2022-08-09 DIAGNOSIS — Z34.03 ENCOUNTER FOR SUPERVISION OF NORMAL FIRST PREGNANCY, THIRD TRIMESTER: ICD-10-CM

## 2022-08-09 DIAGNOSIS — Z3A.37 37 WEEKS GESTATION OF PREGNANCY: ICD-10-CM

## 2022-08-09 DIAGNOSIS — Z3A.39 39 WEEKS GESTATION OF PREGNANCY: ICD-10-CM

## 2022-08-09 DIAGNOSIS — Z3A.38 38 WEEKS GESTATION OF PREGNANCY: ICD-10-CM

## 2022-08-09 DIAGNOSIS — Z32.01 ENCOUNTER FOR PREGNANCY TEST, RESULT POSITIVE: ICD-10-CM

## 2022-08-09 LAB
BILIRUB UR QL STRIP: NORMAL
GLUCOSE UR-MCNC: NORMAL
HCG UR QL: 0.2 EU/DL
HCG UR QL: POSITIVE
HGB UR QL STRIP.AUTO: NORMAL
KETONES UR-MCNC: NORMAL
LEUKOCYTE ESTERASE UR QL STRIP: NORMAL
NITRITE UR QL STRIP: NORMAL
PH UR STRIP: 7
PROT UR STRIP-MCNC: NORMAL
QUALITY CONTROL: YES
SP GR UR STRIP: 1.02

## 2022-08-09 PROCEDURE — 81025 URINE PREGNANCY TEST: CPT

## 2022-08-09 PROCEDURE — 76817 TRANSVAGINAL US OBSTETRIC: CPT

## 2022-08-09 PROCEDURE — 81003 URINALYSIS AUTO W/O SCOPE: CPT | Mod: QW

## 2022-08-09 PROCEDURE — 99213 OFFICE O/P EST LOW 20 MIN: CPT

## 2022-08-11 RX ORDER — DROSPIRENONE AND ETHINYL ESTRADIOL 0.03MG-3MG
3-0.03 KIT ORAL DAILY
Qty: 3 | Refills: 3 | Status: DISCONTINUED | COMMUNITY
Start: 2021-06-22 | End: 2022-08-11

## 2022-08-11 RX ORDER — AMOXICILLIN AND CLAVULANATE POTASSIUM 875; 125 MG/1; MG/1
875-125 TABLET, COATED ORAL
Qty: 20 | Refills: 0 | Status: DISCONTINUED | COMMUNITY
Start: 2020-07-14 | End: 2022-08-11

## 2022-08-11 RX ORDER — DOXYLAMINE SUCCINATE 25 MG
25 TABLET ORAL
Qty: 30 | Refills: 0 | Status: DISCONTINUED | COMMUNITY
Start: 2019-11-12 | End: 2022-08-11

## 2022-08-11 RX ORDER — PYRIDOXINE HCL (VITAMIN B6) 25 MG
25 TABLET ORAL DAILY
Qty: 30 | Refills: 3 | Status: DISCONTINUED | COMMUNITY
Start: 2019-11-12 | End: 2022-08-11

## 2022-08-11 RX ORDER — ESTRADIOL 0.1 MG/G
0.1 CREAM VAGINAL
Qty: 1 | Refills: 0 | Status: DISCONTINUED | COMMUNITY
Start: 2021-06-22 | End: 2022-08-11

## 2022-08-11 RX ORDER — NAPROXEN 500 MG/1
500 TABLET ORAL
Qty: 60 | Refills: 0 | Status: DISCONTINUED | COMMUNITY
Start: 2020-06-30 | End: 2022-08-11

## 2022-08-30 ENCOUNTER — APPOINTMENT (OUTPATIENT)
Dept: OBGYN | Facility: CLINIC | Age: 27
End: 2022-08-30

## 2022-08-30 VITALS
BODY MASS INDEX: 41.15 KG/M2 | DIASTOLIC BLOOD PRESSURE: 70 MMHG | SYSTOLIC BLOOD PRESSURE: 120 MMHG | WEIGHT: 241 LBS | HEIGHT: 64 IN

## 2022-08-30 LAB
BILIRUB UR QL STRIP: NORMAL
GLUCOSE UR-MCNC: NORMAL
HCG UR QL: 0.2 EU/DL
HGB UR QL STRIP.AUTO: NORMAL
KETONES UR-MCNC: NORMAL
LEUKOCYTE ESTERASE UR QL STRIP: NORMAL
NITRITE UR QL STRIP: NORMAL
PH UR STRIP: 5.5
PROT UR STRIP-MCNC: NORMAL
SP GR UR STRIP: 1.03

## 2022-08-30 PROCEDURE — 36415 COLL VENOUS BLD VENIPUNCTURE: CPT

## 2022-08-30 PROCEDURE — 0502F SUBSEQUENT PRENATAL CARE: CPT

## 2022-08-30 PROCEDURE — 76801 OB US < 14 WKS SINGLE FETUS: CPT

## 2022-09-07 LAB
ABO + RH PNL BLD: NORMAL
BASOPHILS # BLD AUTO: 0.04 K/UL
BASOPHILS NFR BLD AUTO: 0.3 %
BLD GP AB SCN SERPL QL: NORMAL
EOSINOPHIL # BLD AUTO: 0.31 K/UL
EOSINOPHIL NFR BLD AUTO: 2.5 %
HBV SURFACE AG SERPL QL IA: NONREACTIVE
HCT VFR BLD CALC: 41.2 %
HGB A MFR BLD: 97.3 %
HGB A2 MFR BLD: 2.7 %
HGB BLD-MCNC: 13 G/DL
HGB FRACT BLD-IMP: NORMAL
HIV1+2 AB SPEC QL IA.RAPID: NONREACTIVE
IMM GRANULOCYTES NFR BLD AUTO: 0.5 %
LYMPHOCYTES # BLD AUTO: 2.52 K/UL
LYMPHOCYTES NFR BLD AUTO: 20.4 %
MAN DIFF?: NORMAL
MCHC RBC-ENTMCNC: 29.7 PG
MCHC RBC-ENTMCNC: 31.6 GM/DL
MCV RBC AUTO: 94.1 FL
MONOCYTES # BLD AUTO: 0.65 K/UL
MONOCYTES NFR BLD AUTO: 5.3 %
NEUTROPHILS # BLD AUTO: 8.8 K/UL
NEUTROPHILS NFR BLD AUTO: 71 %
PLATELET # BLD AUTO: 320 K/UL
RBC # BLD: 4.38 M/UL
RBC # FLD: 13.3 %
TSH SERPL-ACNC: 0.65 UIU/ML
WBC # FLD AUTO: 12.38 K/UL

## 2022-09-08 LAB
HCV AB SER QL: NONREACTIVE
HCV S/CO RATIO: 0.08 S/CO
LEAD BLD-MCNC: <1 UG/DL
MEV IGG FLD QL IA: 75 AU/ML
MEV IGG+IGM SER-IMP: POSITIVE
RUBV IGG FLD-ACNC: 1.8 INDEX
RUBV IGG SER-IMP: POSITIVE
T PALLIDUM AB SER QL IA: NEGATIVE
VZV AB TITR SER: POSITIVE
VZV IGG SER IF-ACNC: 681.4 INDEX

## 2022-09-11 LAB
B19V IGG SER QL IA: 0.04 INDEX
B19V IGG+IGM SER-IMP: NEGATIVE
B19V IGG+IGM SER-IMP: NORMAL
B19V IGM FLD-ACNC: 0.08 INDEX
B19V IGM SER-ACNC: NEGATIVE

## 2022-09-13 ENCOUNTER — NON-APPOINTMENT (OUTPATIENT)
Age: 27
End: 2022-09-13

## 2022-09-13 LAB
CLARI ADDITIONAL INFO: NORMAL
CLARI CHROMOSOME 13: NORMAL
CLARI CHROMOSOME 18: NORMAL
CLARI CHROMOSOME 21: NORMAL
CLARI SEX CHROMOSOMES: NORMAL
CLARI TEST COMMENT: NORMAL
CLARITEST NIPT: NORMAL
FETAL FRACT: NORMAL
GESTATION AGE: NORMAL
MATERNAL WEIGHT (LBS):: NORMAL
PLEASE INCLUDE GENDER RESULTS ON THIS REPORT:: NORMAL
TYPE OF PREGNANCY:: NORMAL

## 2022-09-27 ENCOUNTER — APPOINTMENT (OUTPATIENT)
Dept: OBGYN | Facility: CLINIC | Age: 27
End: 2022-09-27

## 2022-09-27 ENCOUNTER — NON-APPOINTMENT (OUTPATIENT)
Age: 27
End: 2022-09-27

## 2022-09-27 VITALS
BODY MASS INDEX: 41.48 KG/M2 | DIASTOLIC BLOOD PRESSURE: 60 MMHG | WEIGHT: 243 LBS | HEIGHT: 64 IN | SYSTOLIC BLOOD PRESSURE: 122 MMHG

## 2022-09-27 PROCEDURE — 36415 COLL VENOUS BLD VENIPUNCTURE: CPT

## 2022-09-27 PROCEDURE — 0502F SUBSEQUENT PRENATAL CARE: CPT

## 2022-10-10 LAB
AFP MOM: 0.85
AFP VALUE: 19.6 NG/ML
ALPHA FETOPROTEIN SERUM COMMENT: NORMAL
ALPHA FETOPROTEIN SERUM INTERPRETATION: NORMAL
ALPHA FETOPROTEIN SERUM RESULTS: NORMAL
ALPHA FETOPROTEIN SERUM TEST RESULTS: NORMAL
GESTATIONAL AGE BASED ON: NORMAL
GESTATIONAL AGE ON COLLECTION DATE: 15 WEEKS
INSULIN DEP DIABETES: NO
MATERNAL AGE AT EDD AFP: 27.9 YR
MULTIPLE GESTATION: NO
OSBR RISK 1 IN: NORMAL
RACE: NORMAL
WEIGHT AFP: 243 LBS

## 2022-10-31 ENCOUNTER — APPOINTMENT (OUTPATIENT)
Dept: OBGYN | Facility: CLINIC | Age: 27
End: 2022-10-31

## 2022-10-31 VITALS
SYSTOLIC BLOOD PRESSURE: 150 MMHG | DIASTOLIC BLOOD PRESSURE: 50 MMHG | WEIGHT: 241 LBS | HEIGHT: 64 IN | BODY MASS INDEX: 41.15 KG/M2

## 2022-10-31 VITALS — SYSTOLIC BLOOD PRESSURE: 130 MMHG | DIASTOLIC BLOOD PRESSURE: 62 MMHG

## 2022-10-31 PROCEDURE — 0502F SUBSEQUENT PRENATAL CARE: CPT

## 2022-11-04 ENCOUNTER — NON-APPOINTMENT (OUTPATIENT)
Age: 27
End: 2022-11-04

## 2022-11-07 ENCOUNTER — NON-APPOINTMENT (OUTPATIENT)
Age: 27
End: 2022-11-07

## 2022-11-11 ENCOUNTER — ASOB RESULT (OUTPATIENT)
Age: 27
End: 2022-11-11

## 2022-11-11 ENCOUNTER — APPOINTMENT (OUTPATIENT)
Dept: ANTEPARTUM | Facility: CLINIC | Age: 27
End: 2022-11-11

## 2022-11-11 PROCEDURE — 76817 TRANSVAGINAL US OBSTETRIC: CPT

## 2022-11-11 PROCEDURE — 76811 OB US DETAILED SNGL FETUS: CPT

## 2022-11-29 ENCOUNTER — ASOB RESULT (OUTPATIENT)
Age: 27
End: 2022-11-29

## 2022-11-29 ENCOUNTER — APPOINTMENT (OUTPATIENT)
Dept: OBGYN | Facility: CLINIC | Age: 27
End: 2022-11-29

## 2022-11-29 ENCOUNTER — APPOINTMENT (OUTPATIENT)
Dept: ANTEPARTUM | Facility: CLINIC | Age: 27
End: 2022-11-29

## 2022-11-29 VITALS
DIASTOLIC BLOOD PRESSURE: 60 MMHG | BODY MASS INDEX: 41.15 KG/M2 | WEIGHT: 241 LBS | SYSTOLIC BLOOD PRESSURE: 130 MMHG | HEIGHT: 64 IN

## 2022-11-29 LAB
BILIRUB UR QL STRIP: NORMAL
GLUCOSE UR-MCNC: NORMAL
HCG UR QL: 0.2 EU/DL
HGB UR QL STRIP.AUTO: NORMAL
KETONES UR-MCNC: NORMAL
LEUKOCYTE ESTERASE UR QL STRIP: NORMAL
NITRITE UR QL STRIP: NORMAL
PH UR STRIP: 6.5
PROT UR STRIP-MCNC: NORMAL
SP GR UR STRIP: 1.01

## 2022-11-29 PROCEDURE — 0502F SUBSEQUENT PRENATAL CARE: CPT

## 2022-11-29 PROCEDURE — 76816 OB US FOLLOW-UP PER FETUS: CPT

## 2022-12-07 ENCOUNTER — APPOINTMENT (OUTPATIENT)
Dept: PEDIATRIC CARDIOLOGY | Facility: CLINIC | Age: 27
End: 2022-12-07

## 2022-12-07 LAB
BASOPHILS # BLD AUTO: 0.04 K/UL
BASOPHILS NFR BLD AUTO: 0.3 %
EOSINOPHIL # BLD AUTO: 0.22 K/UL
EOSINOPHIL NFR BLD AUTO: 1.5 %
HCT VFR BLD CALC: 41.3 %
HGB BLD-MCNC: 13.4 G/DL
IMM GRANULOCYTES NFR BLD AUTO: 0.5 %
LYMPHOCYTES # BLD AUTO: 2.32 K/UL
LYMPHOCYTES NFR BLD AUTO: 16.1 %
MAN DIFF?: NORMAL
MCHC RBC-ENTMCNC: 30.5 PG
MCHC RBC-ENTMCNC: 32.4 GM/DL
MCV RBC AUTO: 93.9 FL
MONOCYTES # BLD AUTO: 0.58 K/UL
MONOCYTES NFR BLD AUTO: 4 %
NEUTROPHILS # BLD AUTO: 11.18 K/UL
NEUTROPHILS NFR BLD AUTO: 77.6 %
PLATELET # BLD AUTO: 269 K/UL
RBC # BLD: 4.4 M/UL
RBC # FLD: 13.3 %
T PALLIDUM AB SER QL IA: NEGATIVE
WBC # FLD AUTO: 14.41 K/UL

## 2022-12-07 PROCEDURE — 99202 OFFICE O/P NEW SF 15 MIN: CPT

## 2022-12-07 PROCEDURE — 93325 DOPPLER ECHO COLOR FLOW MAPG: CPT | Mod: 59

## 2022-12-07 PROCEDURE — 76820 UMBILICAL ARTERY ECHO: CPT

## 2022-12-07 PROCEDURE — 76825 ECHO EXAM OF FETAL HEART: CPT

## 2022-12-07 PROCEDURE — 76827 ECHO EXAM OF FETAL HEART: CPT

## 2022-12-07 PROCEDURE — 76821 MIDDLE CEREBRAL ARTERY ECHO: CPT

## 2022-12-08 LAB — GLUCOSE 1H P 50 G GLC PO SERPL-MCNC: 111 MG/DL

## 2022-12-12 NOTE — ED ADULT TRIAGE NOTE - RESPIRATORY RATE (BREATHS/MIN)
18 Enbrel Counseling:  I discussed with the patient the risks of etanercept including but not limited to myelosuppression, immunosuppression, autoimmune hepatitis, demyelinating diseases, lymphoma, and infections.  The patient understands that monitoring is required including a PPD at baseline and must alert us or the primary physician if symptoms of infection or other concerning signs are noted.

## 2022-12-27 ENCOUNTER — ASOB RESULT (OUTPATIENT)
Age: 27
End: 2022-12-27

## 2022-12-27 ENCOUNTER — APPOINTMENT (OUTPATIENT)
Dept: ANTEPARTUM | Facility: CLINIC | Age: 27
End: 2022-12-27

## 2022-12-27 ENCOUNTER — APPOINTMENT (OUTPATIENT)
Dept: OBGYN | Facility: CLINIC | Age: 27
End: 2022-12-27

## 2022-12-27 VITALS
SYSTOLIC BLOOD PRESSURE: 130 MMHG | WEIGHT: 245 LBS | BODY MASS INDEX: 41.83 KG/M2 | DIASTOLIC BLOOD PRESSURE: 72 MMHG | HEIGHT: 64 IN

## 2022-12-27 LAB
BILIRUB UR QL STRIP: NORMAL
GLUCOSE UR-MCNC: NORMAL
HCG UR QL: 0.2 EU/DL
HGB UR QL STRIP.AUTO: NORMAL
KETONES UR-MCNC: NORMAL
LEUKOCYTE ESTERASE UR QL STRIP: NORMAL
NITRITE UR QL STRIP: NORMAL
PH UR STRIP: 6
PROT UR STRIP-MCNC: NORMAL
SP GR UR STRIP: 1.01

## 2022-12-27 PROCEDURE — 0502F SUBSEQUENT PRENATAL CARE: CPT

## 2022-12-27 PROCEDURE — 76816 OB US FOLLOW-UP PER FETUS: CPT

## 2023-01-01 ENCOUNTER — APPOINTMENT (OUTPATIENT)
Dept: AFTER HOURS CARE | Facility: EMERGENCY ROOM | Age: 28
End: 2023-01-01
Payer: COMMERCIAL

## 2023-01-01 ENCOUNTER — NON-APPOINTMENT (OUTPATIENT)
Age: 28
End: 2023-01-01

## 2023-01-01 PROCEDURE — 99203 OFFICE O/P NEW LOW 30 MIN: CPT | Mod: 95

## 2023-01-01 NOTE — HISTORY OF PRESENT ILLNESS
[Home] : at home, [unfilled] , at the time of the visit. [Other Location: e.g. Home (Enter Location, City,State)___] : at [unfilled] [Verbal consent obtained from patient] : the patient, [unfilled] [FreeTextEntry8] : 27y F 28w5d EGA by LMP 6/14/22 now p/w sore throat, dry cough, lots of congestion today with coughing, HA, chills,\par fever 100.8. No SOB or VIGIL. +fetal movements as usual, no vag bleeding/dc/ctrx. Asking about treatment options.\par Daughter has COVID

## 2023-01-01 NOTE — ASSESSMENT
[FreeTextEntry1] : Pt aware that we cannot assess the health of her pregnancy via telemedicine, and after lengthy r/b/a discussion,\par would, reasonably, like to avoid antiviral meds despite their endorsement from [employer]G and instead use tylenol for her\par sx. lots of anticipatory guidance given and all questions answered

## 2023-01-01 NOTE — PLAN
[No new medications perscribed] : Treat in place: No new medications prescribed [FreeTextEntry1] : supportive care - tylenol\par work note\par Pulse-ox\par anticipatory guidance incl quarantine & completing vaccine series\par indications to seek ED care discussed, incl SOB, SpO2< 94, dehydration, AMS\par prompt PMD vs OBGYN follow up

## 2023-01-04 ENCOUNTER — NON-APPOINTMENT (OUTPATIENT)
Age: 28
End: 2023-01-04

## 2023-01-10 ENCOUNTER — APPOINTMENT (OUTPATIENT)
Dept: OBGYN | Facility: CLINIC | Age: 28
End: 2023-01-10
Payer: COMMERCIAL

## 2023-01-10 VITALS
BODY MASS INDEX: 41.83 KG/M2 | DIASTOLIC BLOOD PRESSURE: 60 MMHG | HEIGHT: 64 IN | WEIGHT: 245 LBS | SYSTOLIC BLOOD PRESSURE: 110 MMHG

## 2023-01-10 LAB
BILIRUB UR QL STRIP: NORMAL
GLUCOSE UR-MCNC: 100
HCG UR QL: 0.2 EU/DL
HGB UR QL STRIP.AUTO: NORMAL
KETONES UR-MCNC: NORMAL
LEUKOCYTE ESTERASE UR QL STRIP: NORMAL
NITRITE UR QL STRIP: NORMAL
PH UR STRIP: 6.5
PROT UR STRIP-MCNC: NORMAL
SP GR UR STRIP: 1.03

## 2023-01-10 PROCEDURE — 0502F SUBSEQUENT PRENATAL CARE: CPT

## 2023-01-24 ENCOUNTER — APPOINTMENT (OUTPATIENT)
Dept: ANTEPARTUM | Facility: CLINIC | Age: 28
End: 2023-01-24
Payer: COMMERCIAL

## 2023-01-24 ENCOUNTER — APPOINTMENT (OUTPATIENT)
Dept: OBGYN | Facility: CLINIC | Age: 28
End: 2023-01-24
Payer: COMMERCIAL

## 2023-01-24 ENCOUNTER — ASOB RESULT (OUTPATIENT)
Age: 28
End: 2023-01-24

## 2023-01-24 VITALS
BODY MASS INDEX: 42 KG/M2 | WEIGHT: 246 LBS | SYSTOLIC BLOOD PRESSURE: 120 MMHG | DIASTOLIC BLOOD PRESSURE: 60 MMHG | HEIGHT: 64 IN

## 2023-01-24 PROCEDURE — 0502F SUBSEQUENT PRENATAL CARE: CPT

## 2023-01-24 PROCEDURE — 76816 OB US FOLLOW-UP PER FETUS: CPT

## 2023-02-07 ENCOUNTER — APPOINTMENT (OUTPATIENT)
Dept: OBGYN | Facility: CLINIC | Age: 28
End: 2023-02-07
Payer: COMMERCIAL

## 2023-02-07 VITALS
SYSTOLIC BLOOD PRESSURE: 122 MMHG | DIASTOLIC BLOOD PRESSURE: 70 MMHG | HEIGHT: 64 IN | BODY MASS INDEX: 42.68 KG/M2 | WEIGHT: 250 LBS

## 2023-02-07 LAB
BILIRUB UR QL STRIP: NORMAL
GLUCOSE UR-MCNC: NORMAL
HCG UR QL: 0.2 EU/DL
HGB UR QL STRIP.AUTO: NORMAL
KETONES UR-MCNC: NORMAL
LEUKOCYTE ESTERASE UR QL STRIP: NORMAL
NITRITE UR QL STRIP: NORMAL
PH UR STRIP: 7.5
PROT UR STRIP-MCNC: NORMAL
SP GR UR STRIP: 1.01

## 2023-02-07 PROCEDURE — 0502F SUBSEQUENT PRENATAL CARE: CPT

## 2023-02-15 LAB
BASOPHILS # BLD AUTO: 0.04 K/UL
BASOPHILS NFR BLD AUTO: 0.3 %
EOSINOPHIL # BLD AUTO: 0.34 K/UL
EOSINOPHIL NFR BLD AUTO: 2.3 %
HCT VFR BLD CALC: 38.1 %
HGB BLD-MCNC: 12.8 G/DL
HIV1+2 AB SPEC QL IA.RAPID: NONREACTIVE
IMM GRANULOCYTES NFR BLD AUTO: 0.7 %
LYMPHOCYTES # BLD AUTO: 2.17 K/UL
LYMPHOCYTES NFR BLD AUTO: 14.9 %
MAN DIFF?: NORMAL
MCHC RBC-ENTMCNC: 30.5 PG
MCHC RBC-ENTMCNC: 33.6 GM/DL
MCV RBC AUTO: 90.9 FL
MONOCYTES # BLD AUTO: 0.71 K/UL
MONOCYTES NFR BLD AUTO: 4.9 %
NEUTROPHILS # BLD AUTO: 11.24 K/UL
NEUTROPHILS NFR BLD AUTO: 76.9 %
PLATELET # BLD AUTO: 242 K/UL
RBC # BLD: 4.19 M/UL
RBC # FLD: 14.2 %
WBC # FLD AUTO: 14.6 K/UL

## 2023-02-21 ENCOUNTER — APPOINTMENT (OUTPATIENT)
Dept: OBGYN | Facility: CLINIC | Age: 28
End: 2023-02-21
Payer: COMMERCIAL

## 2023-02-21 ENCOUNTER — ASOB RESULT (OUTPATIENT)
Age: 28
End: 2023-02-21

## 2023-02-21 ENCOUNTER — APPOINTMENT (OUTPATIENT)
Dept: ANTEPARTUM | Facility: CLINIC | Age: 28
End: 2023-02-21
Payer: COMMERCIAL

## 2023-02-21 VITALS
BODY MASS INDEX: 42.85 KG/M2 | WEIGHT: 251 LBS | HEIGHT: 64 IN | SYSTOLIC BLOOD PRESSURE: 112 MMHG | DIASTOLIC BLOOD PRESSURE: 58 MMHG

## 2023-02-21 PROCEDURE — 76816 OB US FOLLOW-UP PER FETUS: CPT

## 2023-02-21 PROCEDURE — 0502F SUBSEQUENT PRENATAL CARE: CPT

## 2023-02-24 LAB
GP B STREP DNA SPEC QL NAA+PROBE: NOT DETECTED
SOURCE GBS: NORMAL

## 2023-02-28 ENCOUNTER — APPOINTMENT (OUTPATIENT)
Dept: OBGYN | Facility: CLINIC | Age: 28
End: 2023-02-28
Payer: COMMERCIAL

## 2023-02-28 ENCOUNTER — APPOINTMENT (OUTPATIENT)
Dept: ANTEPARTUM | Facility: CLINIC | Age: 28
End: 2023-02-28
Payer: COMMERCIAL

## 2023-02-28 ENCOUNTER — ASOB RESULT (OUTPATIENT)
Age: 28
End: 2023-02-28

## 2023-02-28 VITALS
SYSTOLIC BLOOD PRESSURE: 120 MMHG | DIASTOLIC BLOOD PRESSURE: 60 MMHG | HEIGHT: 64 IN | WEIGHT: 254 LBS | BODY MASS INDEX: 43.36 KG/M2

## 2023-02-28 PROCEDURE — 76818 FETAL BIOPHYS PROFILE W/NST: CPT

## 2023-02-28 PROCEDURE — 0502F SUBSEQUENT PRENATAL CARE: CPT

## 2023-03-07 ENCOUNTER — APPOINTMENT (OUTPATIENT)
Dept: ANTEPARTUM | Facility: CLINIC | Age: 28
End: 2023-03-07
Payer: COMMERCIAL

## 2023-03-07 ENCOUNTER — APPOINTMENT (OUTPATIENT)
Dept: OBGYN | Facility: CLINIC | Age: 28
End: 2023-03-07
Payer: COMMERCIAL

## 2023-03-07 ENCOUNTER — ASOB RESULT (OUTPATIENT)
Age: 28
End: 2023-03-07

## 2023-03-07 VITALS
WEIGHT: 256 LBS | BODY MASS INDEX: 43.71 KG/M2 | SYSTOLIC BLOOD PRESSURE: 124 MMHG | DIASTOLIC BLOOD PRESSURE: 70 MMHG | HEIGHT: 64 IN

## 2023-03-07 PROCEDURE — ZZZZZ: CPT

## 2023-03-07 PROCEDURE — 0502F SUBSEQUENT PRENATAL CARE: CPT

## 2023-03-07 PROCEDURE — 76818 FETAL BIOPHYS PROFILE W/NST: CPT

## 2023-03-14 ENCOUNTER — APPOINTMENT (OUTPATIENT)
Dept: ANTEPARTUM | Facility: CLINIC | Age: 28
End: 2023-03-14
Payer: COMMERCIAL

## 2023-03-14 ENCOUNTER — NON-APPOINTMENT (OUTPATIENT)
Age: 28
End: 2023-03-14

## 2023-03-14 ENCOUNTER — ASOB RESULT (OUTPATIENT)
Age: 28
End: 2023-03-14

## 2023-03-14 ENCOUNTER — APPOINTMENT (OUTPATIENT)
Dept: OBGYN | Facility: CLINIC | Age: 28
End: 2023-03-14
Payer: COMMERCIAL

## 2023-03-14 VITALS
SYSTOLIC BLOOD PRESSURE: 106 MMHG | WEIGHT: 255 LBS | DIASTOLIC BLOOD PRESSURE: 60 MMHG | HEIGHT: 64 IN | BODY MASS INDEX: 43.54 KG/M2

## 2023-03-14 PROCEDURE — 76818 FETAL BIOPHYS PROFILE W/NST: CPT

## 2023-03-14 PROCEDURE — 0502F SUBSEQUENT PRENATAL CARE: CPT

## 2023-03-14 RX ORDER — METRONIDAZOLE 7.5 MG/G
0.75 GEL VAGINAL
Qty: 1 | Refills: 0 | Status: DISCONTINUED | COMMUNITY
Start: 2023-03-07 | End: 2023-03-14

## 2023-03-21 ENCOUNTER — APPOINTMENT (OUTPATIENT)
Dept: ANTEPARTUM | Facility: CLINIC | Age: 28
End: 2023-03-21
Payer: COMMERCIAL

## 2023-03-21 ENCOUNTER — ASOB RESULT (OUTPATIENT)
Age: 28
End: 2023-03-21

## 2023-03-21 ENCOUNTER — APPOINTMENT (OUTPATIENT)
Dept: OBGYN | Facility: CLINIC | Age: 28
End: 2023-03-21
Payer: COMMERCIAL

## 2023-03-21 ENCOUNTER — APPOINTMENT (OUTPATIENT)
Dept: OBGYN | Facility: CLINIC | Age: 28
End: 2023-03-21

## 2023-03-21 ENCOUNTER — NON-APPOINTMENT (OUTPATIENT)
Age: 28
End: 2023-03-21

## 2023-03-21 ENCOUNTER — INPATIENT (INPATIENT)
Facility: HOSPITAL | Age: 28
LOS: 1 days | Discharge: HOME | End: 2023-03-23
Attending: OBSTETRICS & GYNECOLOGY | Admitting: OBSTETRICS & GYNECOLOGY
Payer: COMMERCIAL

## 2023-03-21 VITALS — SYSTOLIC BLOOD PRESSURE: 138 MMHG | DIASTOLIC BLOOD PRESSURE: 77 MMHG | HEART RATE: 117 BPM

## 2023-03-21 VITALS
BODY MASS INDEX: 43.54 KG/M2 | HEIGHT: 64 IN | DIASTOLIC BLOOD PRESSURE: 70 MMHG | WEIGHT: 255 LBS | SYSTOLIC BLOOD PRESSURE: 116 MMHG

## 2023-03-21 DIAGNOSIS — Z98.890 OTHER SPECIFIED POSTPROCEDURAL STATES: Chronic | ICD-10-CM

## 2023-03-21 DIAGNOSIS — Z34.90 ENCOUNTER FOR SUPERVISION OF NORMAL PREGNANCY, UNSPECIFIED, UNSPECIFIED TRIMESTER: ICD-10-CM

## 2023-03-21 LAB
ALBUMIN SERPL ELPH-MCNC: 4.2 G/DL — SIGNIFICANT CHANGE UP (ref 3.3–5)
ALP SERPL-CCNC: 140 U/L — HIGH (ref 40–120)
ALT FLD-CCNC: 14 U/L — SIGNIFICANT CHANGE UP (ref 4–33)
ANION GAP SERPL CALC-SCNC: 16 MMOL/L — HIGH (ref 7–14)
APTT BLD: 27.3 SEC — SIGNIFICANT CHANGE UP (ref 27–36.3)
AST SERPL-CCNC: 20 U/L — SIGNIFICANT CHANGE UP (ref 4–32)
BASOPHILS # BLD AUTO: 0.04 K/UL — SIGNIFICANT CHANGE UP (ref 0–0.2)
BASOPHILS NFR BLD AUTO: 0.3 % — SIGNIFICANT CHANGE UP (ref 0–2)
BILIRUB SERPL-MCNC: 0.3 MG/DL — SIGNIFICANT CHANGE UP (ref 0.2–1.2)
BILIRUB UR QL STRIP: NORMAL
BLD GP AB SCN SERPL QL: NEGATIVE — SIGNIFICANT CHANGE UP
BUN SERPL-MCNC: 7 MG/DL — SIGNIFICANT CHANGE UP (ref 7–23)
CALCIUM SERPL-MCNC: 9.9 MG/DL — SIGNIFICANT CHANGE UP (ref 8.4–10.5)
CHLORIDE SERPL-SCNC: 103 MMOL/L — SIGNIFICANT CHANGE UP (ref 98–107)
CO2 SERPL-SCNC: 19 MMOL/L — LOW (ref 22–31)
CREAT SERPL-MCNC: 0.49 MG/DL — LOW (ref 0.5–1.3)
EGFR: 132 ML/MIN/1.73M2 — SIGNIFICANT CHANGE UP
EOSINOPHIL # BLD AUTO: 0.22 K/UL — SIGNIFICANT CHANGE UP (ref 0–0.5)
EOSINOPHIL NFR BLD AUTO: 1.4 % — SIGNIFICANT CHANGE UP (ref 0–6)
FIBRINOGEN PPP-MCNC: 777 MG/DL — HIGH (ref 200–465)
GLUCOSE SERPL-MCNC: 82 MG/DL — SIGNIFICANT CHANGE UP (ref 70–99)
GLUCOSE UR-MCNC: NORMAL
HCG UR QL: 0.2 EU/DL
HCT VFR BLD CALC: 44.1 % — SIGNIFICANT CHANGE UP (ref 34.5–45)
HGB BLD-MCNC: 13.8 G/DL — SIGNIFICANT CHANGE UP (ref 11.5–15.5)
HGB UR QL STRIP.AUTO: NORMAL
IANC: 12.21 K/UL — HIGH (ref 1.8–7.4)
IMM GRANULOCYTES NFR BLD AUTO: 0.7 % — SIGNIFICANT CHANGE UP (ref 0–0.9)
INR BLD: 0.98 RATIO — SIGNIFICANT CHANGE UP (ref 0.88–1.16)
KETONES UR-MCNC: NORMAL
LDH SERPL L TO P-CCNC: 193 U/L — SIGNIFICANT CHANGE UP (ref 135–225)
LEUKOCYTE ESTERASE UR QL STRIP: NORMAL
LYMPHOCYTES # BLD AUTO: 14.4 % — SIGNIFICANT CHANGE UP (ref 13–44)
LYMPHOCYTES # BLD AUTO: 2.26 K/UL — SIGNIFICANT CHANGE UP (ref 1–3.3)
MCHC RBC-ENTMCNC: 28.7 PG — SIGNIFICANT CHANGE UP (ref 27–34)
MCHC RBC-ENTMCNC: 31.3 GM/DL — LOW (ref 32–36)
MCV RBC AUTO: 91.7 FL — SIGNIFICANT CHANGE UP (ref 80–100)
MONOCYTES # BLD AUTO: 0.83 K/UL — SIGNIFICANT CHANGE UP (ref 0–0.9)
MONOCYTES NFR BLD AUTO: 5.3 % — SIGNIFICANT CHANGE UP (ref 2–14)
NEUTROPHILS # BLD AUTO: 12.21 K/UL — HIGH (ref 1.8–7.4)
NEUTROPHILS NFR BLD AUTO: 77.9 % — HIGH (ref 43–77)
NITRITE UR QL STRIP: NORMAL
NRBC # BLD: 0 /100 WBCS — SIGNIFICANT CHANGE UP (ref 0–0)
NRBC # FLD: 0 K/UL — SIGNIFICANT CHANGE UP (ref 0–0)
PH UR STRIP: 7
PLATELET # BLD AUTO: 275 K/UL — SIGNIFICANT CHANGE UP (ref 150–400)
POTASSIUM SERPL-MCNC: 3.9 MMOL/L — SIGNIFICANT CHANGE UP (ref 3.5–5.3)
POTASSIUM SERPL-SCNC: 3.9 MMOL/L — SIGNIFICANT CHANGE UP (ref 3.5–5.3)
PROT SERPL-MCNC: 7.2 G/DL — SIGNIFICANT CHANGE UP (ref 6–8.3)
PROT UR STRIP-MCNC: NORMAL
PROTHROM AB SERPL-ACNC: 11.4 SEC — SIGNIFICANT CHANGE UP (ref 10.5–13.4)
RBC # BLD: 4.81 M/UL — SIGNIFICANT CHANGE UP (ref 3.8–5.2)
RBC # FLD: 14.1 % — SIGNIFICANT CHANGE UP (ref 10.3–14.5)
RH IG SCN BLD-IMP: POSITIVE — SIGNIFICANT CHANGE UP
SODIUM SERPL-SCNC: 138 MMOL/L — SIGNIFICANT CHANGE UP (ref 135–145)
SP GR UR STRIP: 1.02
URATE SERPL-MCNC: 4 MG/DL — SIGNIFICANT CHANGE UP (ref 2.5–7)
WBC # BLD: 15.67 K/UL — HIGH (ref 3.8–10.5)
WBC # FLD AUTO: 15.67 K/UL — HIGH (ref 3.8–10.5)

## 2023-03-21 PROCEDURE — 0502F SUBSEQUENT PRENATAL CARE: CPT

## 2023-03-21 PROCEDURE — 76818 FETAL BIOPHYS PROFILE W/NST: CPT

## 2023-03-21 RX ORDER — CHLORHEXIDINE GLUCONATE 213 G/1000ML
1 SOLUTION TOPICAL ONCE
Refills: 0 | Status: DISCONTINUED | OUTPATIENT
Start: 2023-03-21 | End: 2023-03-22

## 2023-03-21 RX ORDER — INFLUENZA VIRUS VACCINE 15; 15; 15; 15 UG/.5ML; UG/.5ML; UG/.5ML; UG/.5ML
0.5 SUSPENSION INTRAMUSCULAR ONCE
Refills: 0 | Status: DISCONTINUED | OUTPATIENT
Start: 2023-03-21 | End: 2023-03-23

## 2023-03-21 RX ORDER — OXYTOCIN 10 UNIT/ML
333.33 VIAL (ML) INJECTION
Qty: 20 | Refills: 0 | Status: DISCONTINUED | OUTPATIENT
Start: 2023-03-21 | End: 2023-03-22

## 2023-03-21 RX ORDER — SODIUM CHLORIDE 9 MG/ML
1000 INJECTION, SOLUTION INTRAVENOUS
Refills: 0 | Status: DISCONTINUED | OUTPATIENT
Start: 2023-03-21 | End: 2023-03-22

## 2023-03-21 RX ORDER — CITRIC ACID/SODIUM CITRATE 300-500 MG
15 SOLUTION, ORAL ORAL EVERY 6 HOURS
Refills: 0 | Status: DISCONTINUED | OUTPATIENT
Start: 2023-03-21 | End: 2023-03-22

## 2023-03-21 NOTE — OB PROVIDER H&P - HISTORY OF PRESENT ILLNESS
R1 Admission H&P    Subjective  HPI: 28yo  at 40w0d presents for eIOL. +FM. -LOF. +CTXs (irregular). -VB. Pt denies any other concerns.    PNC: GBS- ().  EFW 2741g by gus (), extrapolated to 3657g today. Pt reports single elevated BP reading in the ~150s/60s around 20-22 weeks, with normal BPs since then. She was started on Aspirin 81mg/162mg alternating until 36w for PEC prophylaxis. Pt did not receive Tdap and flu vaccines during this pregnancy.  OBHx:   2020 -  FT, 8#2 girl, required vacuum-assisted delivery, complicated by 3rd degree perineal laceration   - Spontaneous  at 7w  GynHx: denies  PMH: denies  PSH: 2x L knee meniscus repair in 2017 and 2018  Meds: PNV  Allergies: NKDA  Social: denies substance use  Will accept blood transfusions? Yes

## 2023-03-21 NOTE — OB PROVIDER H&P - NSHPPHYSICALEXAM_GEN_ALL_CORE
Objective  VS  T(C): --  T(F): --  HR: 117 (21 Mar 2023 21:38) (117 - 117)  BP: 138/77 (21 Mar 2023 21:38) (138/77 - 138/77)  BP(mean): --  RR: --  SpO2: --    PE:   CV: clinically well perfused  Pulm: breathing comfortably on RA  Abd: gravid, nontender  Extr: moving all extremities with ease     VE: 1, 50, -3    FHT: baseline 140, mod variability, +accels, -decels, **discontinuous tracing  [place]: uterine irritability  Sono: vertex Objective  VS  T(C): --  T(F): --  HR: 117 (21 Mar 2023 21:38) (117 - 117)  BP: 138/77 (21 Mar 2023 21:38) (138/77 - 138/77)  BP(mean): --  RR: --  SpO2: --    PE:   CV: clinically well perfused  Pulm: breathing comfortably on RA  Abd: gravid, nontender  Extr: moving all extremities with ease     VE: 1, 50, -3    FHT: baseline 140, mod variability, +accels, -decels  Brogan: uterine irritability  Sono: vertex

## 2023-03-21 NOTE — OB PROVIDER H&P - ASSESSMENT
Assessment  26yo  at 40w0d presents for eIOL.    Plan  - Admit to LND. HELLP Labs. IVF.  - IOL w/ PO  - Fetus: cat 1 tracing, **discontinuous tracing. VTX. EFW 2741g by sono (), extrapolated to 3657g today. Continuous EFM. Sono. No concerns.  - Prenatal issues: Single elevated BP to 150s/60s in 2nd trimester (~20-22w)  - GBS- ()  - Pain: epidural PRN      D/w attending Dr. Oliva-Ba Sauceda (MS3)   Assessment  28yo  at 40w0d presents for eIOL.    Plan  - Admit to LND. HELLP Labs. IVF.  - IOL w/ PO  - Fetus: cat 1 tracing,. VTX. EFW 2741g by sono (), extrapolated to 3657g today. Continuous EFM. Sono. No concerns.  - Prenatal issues: Single elevated BP to 150s/60s in 2nd trimester (~20-22w), mild range BP on admission- HELLP labs ordered  - GBS- ()  - Pain: epidural PRN      D/w attending Dr. Oliva-Ba Sauceda (MS3)  ARTURO Howell NP

## 2023-03-21 NOTE — OB RN PATIENT PROFILE - NS_PARA_OBGYN_ALL_OB_NU
[address] [facility] [address]    Phone:  [phone]       Thank You for choosing us for your health care visit. We are glad to serve you and happy to provide you with this summary of your visit. Please help us to ensure we have accurate records. If you find anything that needs to be changed, please let our staff know as soon as possible.           Your Demographic Information     Patient Name Sex AUGUSTO Winter Male 1962       Ethnic Group Patient Race    Not of  or  Origin White      Your Visit Details     Date & Time Provider Department    2017 11:00 AM Cesilia DORYS Richardson [address] [facility]      Your Upcoming Appointment*(Max 10)     2017 10:30 AM CST   Behavioral Health Extended Follow-Up with Madelyn Villeda LCSW   [address] [place] [address] ([address] [place] [address])    [address] [facility]   [phone]             11:15 AM CST   Behavioral Health Follow-Up with Isi Swenson   [address] [place] [address] ([facility])    [facility] [phone]            2017 11:30 AM CST   [address] with Madelyn Villeda LCSW   [address] [facility] ([address] [place] [address])    [address] [facility]   [phone]            2017 10:30 AM CDT   Behavioral Health Extended Follow-Up with Madelyn Villeda LCSW   [facility] [address] [place] [address] [phone]            2017 10:15 AM CDT   Complete Ophthalmology Exam with Germán Chan, [address]  Ophthalmology [facility])    [facility] [address] [phone]            Monday April 24, 2017  1:00 PM CDT   Office Visit with DORYS Coe   [facility] ([address])    [address] [address] [phone]            Monday May 01, 2017  3:15 PM CDT   Follow-up Visit with Karol Barboza MD   [address] [facility] Cardiology [facility])    [address] [place] [address] 86               Your To Do List     Follow-Up    Return has appt. Conditions Discussed Today or Order-Related Diagnoses        Comments    Dysthymic disorder         Anxiety disorder in conditions classified elsewhere         Major depressive disorder, recurrent episode, moderate         PD (personality disorder)         Other specified anxiety disorders         Personality disorder           Your Vitals Were     BP Pulse Height Weight BMI Smoking Status    118/74 86 6' (1.829 m) 269 lb (122 kg) 36.48 kg/m2 Never Smoker      Medications Prescribed or Re-Ordered Today     None      Your Current Medications Are        Disp Refills Start End    lithium (ESKALITH) 450 MG CR tablet        Sig - Route: Take 450 mg by mouth daily. - Oral    Class: Historical Med    lamoTRIgine (LAMICTAL) 25 MG tablet        Sig - Route: Take 25 mg by mouth daily. - Oral    Class: Historical Med    insulin lispro (HUMALOG KWIKPEN) 100 UNIT/ML pen-injector        Sig - Route: Inject 100 Units into the skin 3 times daily (before meals).  Per sliding sclaer, max of 12units TID - Subcutaneous    Class: Historical Med    DIAZepam (VALIUM) 5 MG tablet 90 tablet 5 2/17/2017     Sig: TAKE ONE TABLET BY MOUTH THREE TIMES A DAY AS NEEDED FOR ANXIETY    Class: Script Not Printed    Cosign for Ordering: Accepted by DORYS Coe on 2/18/2017  6:17 AM    gabapentin (NEURONTIN) 300 MG capsule 450 capsule 11 11/21/2016     Sig: TAKE TWO CAPSULES BY MOUTH EVERY MORNING AND THREE NIGHTLY AT BEDTIME    Class: Eprescribe "lisinopril (ZESTRIL) 10 MG tablet 180 tablet 1 11/21/2016     Sig - Route: Take 1 tablet by mouth daily. - Oral    Class: Eprescribe    lithium (LITHOBID) 300 MG CR tablet 270 tablet 3 11/18/2016     Sig - Route: Take 3 tablets by mouth nightly. - Oral    Class: Eprescribe    traZODONE (DESYREL) 100 MG tablet 540 tablet 3 11/18/2016     Sig - Route: Take 2-3 tablets by mouth nightly as needed for Sleep. - Oral    Class: Eprescribe    venlafaxine XR (EFFEXOR XR) 150 MG 24 hr capsule 360 capsule 3 11/18/2016     Sig - Route: Take 2 capsules by mouth daily. - Oral    Class: Eprescribe    metformin (GLUCOPHAGE-XR) 500 MG 24 hr tablet 720 tablet 1 11/7/2016     Sig - Route: Take 2 tablets by mouth 2 times daily. - Oral    Class: Eprescribe    insulin aspart (NOVOLOG FLEXPEN) 100 UNIT/ML injection 45 mL 1 7/22/2016     Sig: Use sliding scale, maximum of 12 units three times daily. Class: Script Not Printed    fenofibrate 160 MG tablet 180 tablet 1 7/14/2016     Sig - Route: Take 1 tablet by mouth daily. - Oral    Class: Eprescribe    atorvastatin (LIPITOR) 40 MG tablet 180 tablet 1 7/14/2016     Sig - Route: Take 1 tablet by mouth daily. - Oral    Class: Eprescribe    omega-3 acid ethyl esters (LOVAZA) 1 G capsule 240 capsule 1 7/14/2016     Sig: Take 2 capsules twice daily    Class: Eprescribe    Insulin Pen Needle (B-D U/F PEN NEEDLE 5/16"") 31G X 8 MM Misc 540 each 3 7/14/2016     Sig: Injects 6x per day    Class: Eprescribe    blood glucose test strips (ACCU-CHEK COMPACT PLUS) 360 strip 3 7/14/2016     Sig: Tests 4x daily    Class: Eprescribe    Notes to Pharmacy: **Patient requests 90 days supply**    disulfiram (ANTABUSE) 250 MG tablet 180 tablet 1 1/27/2016     Sig - Route:  Take 1 tablet by mouth daily. - Oral    Class: Eprescribe    Insulin Syringes, Disposable, U-100 1 ML Misc 60 each 12 1/21/2016     Sig: Inject twice daily    Class: Eprescribe    DISPENSE 1 Units 0 8/10/2015     Sig: ED pump. 250.00 and " 607.84    fluticasone (FLONASE) 50 MCG/ACT nasal spray 3 Bottle 3 2015     Sig - Route: Spray 2 sprays in each nostril daily. - Nasal    Class: Eprescribe    Notes to Pharmacy: **Patient requests 90 days supply**    DISPENSE 1 Units 6 2014     Sig: Dispense flex touch pen for insulin administration. 250.00    Class: Eprescribe    aspirin 81 MG tablet        Sig - Route: Take 81 mg by mouth daily. - Oral    Class: Historical Med    insulin glargine (LANTUS) 100 UNIT/ML injection 25 vial 3 2016     Si units in a.m., 72 units p.m. subQ, Titrate up by 2 units every 3 days until blood sugar around 130. Pt uses max of 150 units daily. Class: Script Not Printed      Allergies     Zocor [Simvastatin] MYALGIA      Immunizations History as of 2017     Name Date    INFLUENZA QUADRIVALENT 10/19/2016, 10/2/2015  9:17 AM, 2014    Influenza 10/31/2013  4:03 PM, 10/18/2012, 2011, 2010, 2009    Pneumococcal Polysaccharide Adult 2010    Tdap 2009      Problem List as of 2017     Essential hypertension    Sleep apnea    Persistent disorder of initiating or maintaining sleep    Morbid obesity    HLD (hyperlipidemia)    Esophagitis    Type II or unspecified type diabetes mellitus without mention of complication, not stated as uncontrolled    Type 1 diabetes mellitus    Depression    Alcohol dependence    Vitamin D deficiency              Patient Instructions    Sliding scale is as follows:   blood sugar 0-120 equals 0 units,   blood sugar 121-150 equals 2 units,   blood sugar 151-200 equals 4 units,   blood sugar 201-250 equals 5 units,   blood sugar 251-300 equals 6 units,   blood sugar 301-350 equals 8 units,   blood sugar 351-400 equals 10 units,   blood sugar 401-450 [id number] units and call provider. Â    For blood sugars over 450, call provider for instruction. 1

## 2023-03-21 NOTE — OB PROVIDER H&P - NSLOWPPHRISK_OBGYN_A_OB
No previous uterine incision/Henao Pregnancy/Less than or equal to 4 previous vaginal births/No known bleeding disorder/No history of postpartum hemorrhage/No other PPH risks indicated

## 2023-03-21 NOTE — OB RN PATIENT PROFILE - FALL HARM RISK - UNIVERSAL INTERVENTIONS
Bed in lowest position, wheels locked, appropriate side rails in place/Call bell, personal items and telephone in reach/Instruct patient to call for assistance before getting out of bed or chair/Non-slip footwear when patient is out of bed/[place] to call system/Physically safe environment - no spills, clutter or unnecessary equipment/Purposeful Proactive Rounding/Room/bathroom lighting operational, light cord in reach

## 2023-03-21 NOTE — OB RN PATIENT PROFILE - FUNCTIONAL ASSESSMENT - BASIC MOBILITY 6.
4-calculated by average/Not able to assess (calculate score using [facility] averaging method)

## 2023-03-22 ENCOUNTER — TRANSCRIPTION ENCOUNTER (OUTPATIENT)
Age: 28
End: 2023-03-22

## 2023-03-22 DIAGNOSIS — E66.01 MORBID (SEVERE) OBESITY DUE TO EXCESS CALORIES: ICD-10-CM

## 2023-03-22 LAB
APPEARANCE UR: CLEAR — SIGNIFICANT CHANGE UP
BACTERIA # UR AUTO: NEGATIVE — SIGNIFICANT CHANGE UP
BILIRUB UR-MCNC: NEGATIVE — SIGNIFICANT CHANGE UP
COLOR SPEC: YELLOW — SIGNIFICANT CHANGE UP
COVID-19 SPIKE DOMAIN AB INTERP: POSITIVE
COVID-19 SPIKE DOMAIN ANTIBODY RESULT: >250 U/ML — HIGH
CREAT ?TM UR-MCNC: 152 MG/DL — SIGNIFICANT CHANGE UP
DIFF PNL FLD: ABNORMAL
EPI CELLS # UR: 3 /HPF — SIGNIFICANT CHANGE UP (ref 0–5)
GLUCOSE UR QL: NEGATIVE — SIGNIFICANT CHANGE UP
HYALINE CASTS # UR AUTO: 2 /LPF — SIGNIFICANT CHANGE UP (ref 0–7)
KETONES UR-MCNC: ABNORMAL
LEUKOCYTE ESTERASE UR-ACNC: NEGATIVE — SIGNIFICANT CHANGE UP
NITRITE UR-MCNC: NEGATIVE — SIGNIFICANT CHANGE UP
PH UR: 6 — SIGNIFICANT CHANGE UP (ref 5–8)
PROT ?TM UR-MCNC: 20 MG/DL — SIGNIFICANT CHANGE UP
PROT ?TM UR-MCNC: 20 MG/DL — SIGNIFICANT CHANGE UP
PROT UR-MCNC: ABNORMAL
PROT/CREAT UR-RTO: 0.1 RATIO — SIGNIFICANT CHANGE UP (ref 0–0.2)
RBC CASTS # UR COMP ASSIST: 0 /HPF — SIGNIFICANT CHANGE UP (ref 0–4)
SARS-COV-2 IGG+IGM SERPL QL IA: >250 U/ML — HIGH
SARS-COV-2 IGG+IGM SERPL QL IA: POSITIVE
SARS-COV-2 RNA SPEC QL NAA+PROBE: SIGNIFICANT CHANGE UP
SP GR SPEC: 1.02 — SIGNIFICANT CHANGE UP (ref 1.01–1.05)
T PALLIDUM AB TITR SER: NEGATIVE — SIGNIFICANT CHANGE UP
UROBILINOGEN FLD QL: SIGNIFICANT CHANGE UP
WBC UR QL: 5 /HPF — SIGNIFICANT CHANGE UP (ref 0–5)

## 2023-03-22 PROCEDURE — 59400 OBSTETRICAL CARE: CPT | Mod: U9

## 2023-03-22 PROCEDURE — 93010 ELECTROCARDIOGRAM REPORT: CPT

## 2023-03-22 RX ORDER — HYDROCORTISONE 1 %
1 OINTMENT (GRAM) TOPICAL EVERY 6 HOURS
Refills: 0 | Status: DISCONTINUED | OUTPATIENT
Start: 2023-03-22 | End: 2023-03-23

## 2023-03-22 RX ORDER — LANOLIN
1 OINTMENT (GRAM) TOPICAL EVERY 6 HOURS
Refills: 0 | Status: DISCONTINUED | OUTPATIENT
Start: 2023-03-22 | End: 2023-03-23

## 2023-03-22 RX ORDER — OXYTOCIN 10 UNIT/ML
2 VIAL (ML) INJECTION
Qty: 30 | Refills: 0 | Status: DISCONTINUED | OUTPATIENT
Start: 2023-03-22 | End: 2023-03-22

## 2023-03-22 RX ORDER — ACETAMINOPHEN 500 MG
975 TABLET ORAL
Refills: 0 | Status: DISCONTINUED | OUTPATIENT
Start: 2023-03-22 | End: 2023-03-23

## 2023-03-22 RX ORDER — OXYCODONE HYDROCHLORIDE 5 MG/1
5 TABLET ORAL
Refills: 0 | Status: DISCONTINUED | OUTPATIENT
Start: 2023-03-22 | End: 2023-03-23

## 2023-03-22 RX ORDER — SIMETHICONE 80 MG/1
80 TABLET, CHEWABLE ORAL EVERY 4 HOURS
Refills: 0 | Status: DISCONTINUED | OUTPATIENT
Start: 2023-03-22 | End: 2023-03-23

## 2023-03-22 RX ORDER — IBUPROFEN 200 MG
600 TABLET ORAL EVERY 6 HOURS
Refills: 0 | Status: COMPLETED | OUTPATIENT
Start: 2023-03-22 | End: 2024-02-18

## 2023-03-22 RX ORDER — DIPHENHYDRAMINE HCL 50 MG
25 CAPSULE ORAL EVERY 6 HOURS
Refills: 0 | Status: DISCONTINUED | OUTPATIENT
Start: 2023-03-22 | End: 2023-03-23

## 2023-03-22 RX ORDER — OXYTOCIN 10 UNIT/ML
41.67 VIAL (ML) INJECTION
Qty: 20 | Refills: 0 | Status: DISCONTINUED | OUTPATIENT
Start: 2023-03-22 | End: 2023-03-23

## 2023-03-22 RX ORDER — PRAMOXINE HYDROCHLORIDE 150 MG/15G
1 AEROSOL, FOAM RECTAL EVERY 4 HOURS
Refills: 0 | Status: DISCONTINUED | OUTPATIENT
Start: 2023-03-22 | End: 2023-03-23

## 2023-03-22 RX ORDER — SODIUM CHLORIDE 9 MG/ML
500 INJECTION, SOLUTION INTRAVENOUS ONCE
Refills: 0 | Status: COMPLETED | OUTPATIENT
Start: 2023-03-22 | End: 2023-03-22

## 2023-03-22 RX ORDER — AER TRAVELER 0.5 G/1
1 SOLUTION RECTAL; TOPICAL EVERY 4 HOURS
Refills: 0 | Status: DISCONTINUED | OUTPATIENT
Start: 2023-03-22 | End: 2023-03-23

## 2023-03-22 RX ORDER — BENZOCAINE 10 %
1 GEL (GRAM) MUCOUS MEMBRANE EVERY 6 HOURS
Refills: 0 | Status: DISCONTINUED | OUTPATIENT
Start: 2023-03-22 | End: 2023-03-23

## 2023-03-22 RX ORDER — MAGNESIUM HYDROXIDE 400 MG/1
30 TABLET, CHEWABLE ORAL
Refills: 0 | Status: DISCONTINUED | OUTPATIENT
Start: 2023-03-22 | End: 2023-03-23

## 2023-03-22 RX ORDER — SODIUM CHLORIDE 9 MG/ML
3 INJECTION INTRAMUSCULAR; INTRAVENOUS; SUBCUTANEOUS EVERY 8 HOURS
Refills: 0 | Status: DISCONTINUED | OUTPATIENT
Start: 2023-03-22 | End: 2023-03-23

## 2023-03-22 RX ORDER — SODIUM CHLORIDE 9 MG/ML
500 INJECTION, SOLUTION INTRAVENOUS ONCE
Refills: 0 | Status: DISCONTINUED | OUTPATIENT
Start: 2023-03-22 | End: 2023-03-22

## 2023-03-22 RX ORDER — DIBUCAINE 1 %
1 OINTMENT (GRAM) RECTAL EVERY 6 HOURS
Refills: 0 | Status: DISCONTINUED | OUTPATIENT
Start: 2023-03-22 | End: 2023-03-23

## 2023-03-22 RX ORDER — OXYCODONE HYDROCHLORIDE 5 MG/1
5 TABLET ORAL ONCE
Refills: 0 | Status: DISCONTINUED | OUTPATIENT
Start: 2023-03-22 | End: 2023-03-23

## 2023-03-22 RX ORDER — TETANUS TOXOID, REDUCED DIPHTHERIA TOXOID AND ACELLULAR PERTUSSIS VACCINE, ADSORBED 5; 2.5; 8; 8; 2.5 [IU]/.5ML; [IU]/.5ML; UG/.5ML; UG/.5ML; UG/.5ML
0.5 SUSPENSION INTRAMUSCULAR ONCE
Refills: 0 | Status: DISCONTINUED | OUTPATIENT
Start: 2023-03-22 | End: 2023-03-23

## 2023-03-22 RX ORDER — KETOROLAC TROMETHAMINE 30 MG/ML
30 SYRINGE (ML) INJECTION ONCE
Refills: 0 | Status: DISCONTINUED | OUTPATIENT
Start: 2023-03-22 | End: 2023-03-22

## 2023-03-22 RX ADMIN — SODIUM CHLORIDE 3 MILLILITER(S): 9 INJECTION INTRAMUSCULAR; INTRAVENOUS; SUBCUTANEOUS at 22:34

## 2023-03-22 RX ADMIN — Medication 2 MILLIUNIT(S)/MIN: at 12:22

## 2023-03-22 RX ADMIN — SODIUM CHLORIDE 1000 MILLILITER(S): 9 INJECTION, SOLUTION INTRAVENOUS at 17:22

## 2023-03-22 RX ADMIN — Medication 30 MILLIGRAM(S): at 15:56

## 2023-03-22 RX ADMIN — Medication 125 MILLIUNIT(S)/MIN: at 18:28

## 2023-03-22 RX ADMIN — Medication 30 MILLIGRAM(S): at 18:50

## 2023-03-22 NOTE — CHART NOTE - NSCHARTNOTEFT_GEN_A_CORE
BP elevated to 140/80s in patient. Pt reports single elevated BP reading in the ~150s/60s around 20-22 weeks. Given two event of elevated BP, patient at this time meets criteria for pre-eclampsia.  HELLP Labs are wnl (see below), P/C 0.1, patient denies vision changes, SOB, epigastric pain or chest pain. Complaining only of contractions pains. Denies any form of pain control.  Will continue to monitor for s/s of PEC, BP tends. Continue w/ labor induction.      Amyeo Afroz Jereen, PGY-2  d/w Dr Hernandez                 13.8   15.67 )-----------( 275      ( 03-21 @ 22:14 )             44.1     03-21 @ 22:14    138  |  103  |  7   ----------------------------<  82  3.9   |  19  |  0.49    Ca    9.9      03-21 @ 22:14    TPro  7.2  /  Alb  4.2  /  TBili  0.3  /  DBili  x   /  AST  20  /  ALT  14  /  AlkPhos  140  03-21 @ 22:14    PT/INR - ( 03-21 @ 22:14 )   PT: 11.4 sec;   INR: 0.98 ratio    PTT - ( 03-21 @ 22:14 )  PTT:27.3 sec    Uric Acid: (03-21 @ 22:14)  4.0      Fibrinogen: (03-21 @ 22:14)  --       LDH: (03-21 @ 22:14)  193 BP elevated to 140/80s in patient. Pt reports single elevated BP reading in the ~150s/60s around 20-22 weeks. Given two event of elevated BP, patient at this time meets criteria for gHTN.  HELLP Labs are wnl (see below), P/C 0.1, patient denies vision changes, SOB, epigastric pain or chest pain. Complaining only of contractions pains. Denies any form of pain control.  Will continue to monitor for s/s of PEC, BP tends. Continue w/ labor induction.      Amyeo Afroz Jereen, PGY-2  d/w Dr Hernandez                 13.8   15.67 )-----------( 275      ( 03-21 @ 22:14 )             44.1     03-21 @ 22:14    138  |  103  |  7   ----------------------------<  82  3.9   |  19  |  0.49    Ca    9.9      03-21 @ 22:14    TPro  7.2  /  Alb  4.2  /  TBili  0.3  /  DBili  x   /  AST  20  /  ALT  14  /  AlkPhos  140  03-21 @ 22:14    PT/INR - ( 03-21 @ 22:14 )   PT: 11.4 sec;   INR: 0.98 ratio    PTT - ( 03-21 @ 22:14 )  PTT:27.3 sec    Uric Acid: (03-21 @ 22:14)  4.0      Fibrinogen: (03-21 @ 22:14)  --       LDH: (03-21 @ 22:14)  193

## 2023-03-22 NOTE — OB PROVIDER DELIVERY SUMMARY - NSPROVIDERDELIVERYNOTE_OBGYN_ALL_OB_FT
Patient was fully dilated and pushing. Fetal head was OA and delivered without difficulty. The anterior and posterior shoulders delivered, followed by the remaining body atraumatically. Delayed cord clamping was performed, and then clamped and cut. Cord blood gases collected x2. The  was then handed to the pediatric team for eval. The placenta delivered intact with membranes. Pitocin was administered. Uterus massaged, fundus found to be firm. Cervix, vagina and perineum inspected, 2nd degree laceration noted, repaired using chromic in the usual fashion with good hemostasis.     Viable female infant delivered, weighing 3485 grams with APGARs 8/9    Dr. Hernandez present for and participated in the delivery  ELIZABETH Andres

## 2023-03-22 NOTE — DISCHARGE NOTE OB - PATIENT PORTAL LINK FT
You can access the FollowMyHealth Patient Portal offered by [facility] by registering at the following website: [email or web address]. By joining [employer]’s FollowMyHealth portal, you will also be able to view your health information using other applications (apps) compatible with our system.

## 2023-03-22 NOTE — DISCHARGE NOTE OB - CARE PLAN
1 Principal Discharge DX:	 (normal spontaneous vaginal delivery)  Assessment and plan of treatment:	humera

## 2023-03-22 NOTE — OB RN DELIVERY SUMMARY - NSSELHIDDEN_OBGYN_ALL_OB_FT
[NS_DeliveryAttending1_OBGYN_ALL_OB_FT:[id number]],[NS_DeliveryAssist2_OBGYN_ALL_OB_FT:[id number]],[NS_DeliveryRN_OBGYN_ALL_OB_FT:[id number]]

## 2023-03-22 NOTE — OB PROVIDER LABOR PROGRESS NOTE - NS_OBIHIFHRDETAILS_OBGYN_ALL_OB_FT
Cat 2 150/mod/intermittent variable decelerations
category 1
baseline 145 bpm, + accelerations, occasional mild variables

## 2023-03-22 NOTE — DISCHARGE NOTE OB - CARE PROVIDER_API CALL
César Hernandez)  Obstetrics and Gynecology  [address] [facility], [address] [place] [address]  Phone: [phone]  Fax: [phone]  Follow Up Time:

## 2023-03-22 NOTE — OB NEONATOLOGY/PEDIATRICIAN DELIVERY SUMMARY - NSPEDSNEONOTESA_OBGYN_ALL_OB_FT
Peds called to LDR for Cat II 40.1wk female born via  to a 26y/o  mother. No significant maternal or prenatal history. Maternal labs include Blood Type B+ , HIV - , RPR NR , Rubella I , Hep B - , GBS - /, COVID -. AROM at 11:45AM on 3/22/23 with clear fluids (AROM hours: 2H30M).  Baby emerged vigorous, crying, was w/d/s/s with APGARS of 8/9. Mom plans to initiate breastfeeding +formula feed, consents  Hep B vaccine .  Highest maternal temp:36.8. EOS 0.06    Physical Exam:  Gen: no acute distress, +grimace  HEENT:  anterior fontanel open soft and flat, nondysmoprhic facies, no cleft lip/palate, ears normal set, no ear pits or tags, nares clinically patent  Resp: Normal respiratory effort without grunting or retractions, good air entry b/l, clear to auscultation bilaterally  Cardio: Present S1/S2, regular rate and rhythm, no murmurs  Abd: soft, non tender, non distended, umbilical cord with 3 vessels  Neuro: +palmar and plantar grasp, +suck, +isabella, normal tone  Extremities: negative rollins and ortolani maneuvers, moving all extremities, no clavicular crepitus or stepoff  Skin: acrocyanosis, warm  Genitals:[Normal female anatomy] Dexter 1, anus patent

## 2023-03-22 NOTE — OB PROVIDER LABOR PROGRESS NOTE - ASSESSMENT
cervical balloon was placed without difficulties, actions were explained to patient and family; patient tolerated procedure well; continue labor induction with PO Cytotec; will reevaluate prn.    Arminda Maria CNM
pt tolerated exam well, painful contractions w/ pitocin at 4u    - pt declines epidural  - cont w/ pitocin  - con/t left lateral position for resuscitation    Amyeo Afroz Jereen, PGY-2  d/w Dr Hernandez
as per Dr Hernandez's plan:  - AROM for clear amniotic fluid performed, patient tolerated procedure well  -continue induction with Pitocin  - actions and plan of huma reviewed with patient,   - will reassess Arminda melgar CNM

## 2023-03-22 NOTE — OB RN DELIVERY SUMMARY - NS_SEPSISRSKCALC_OBGYN_ALL_OB_FT
EOS calculated successfully. EOS Risk Factor: 0.5/1000 live births ([facility] national incidence); GA=40w1d; Temp=98.24; ROM=2.717; GBS='Negative'; Antibiotics='No antibiotics or any antibiotics < 2 hrs prior to birth'

## 2023-03-22 NOTE — OB PROVIDER DELIVERY SUMMARY - NSSELHIDDEN_OBGYN_ALL_OB_FT
[NS_DeliveryAttending1_OBGYN_ALL_OB_FT:[id number]],[NS_DeliveryAssist1_OBGYN_ALL_OB_FT:[id number]]

## 2023-03-22 NOTE — CHART NOTE - NSCHARTNOTEFT_GEN_A_CORE
S: RN informed me reelevated HRs. Pt complaining of intermittent contractions pains, otherwise, patient denies chest pain, shortness of breath, fevers, chills, nausea or vomiting. Feels well overall. Denies hx of tachycardia.    O: ICU Vital Signs Last 24 Hrs  T(C): 37 (22 Mar 2023 15:06), Max: 37 (22 Mar 2023 15:06)  HR: 122 (22 Mar 2023 18:16) (79 - 151)  BP: 128/61 (22 Mar 2023 17:04) (111/56 - 150/80)  RR: 17 (22 Mar 2023 10:08) (16 - 17)  SpO2: 97% (22 Mar 2023 18:16) (89% - 100%)    O2 Parameters below as of 21 Mar 2023 22:03  Patient On (Oxygen Delivery Method): room air    Gen: NAD  Abd: soft, non-tender, non-distended, fundus firm  : normal post-partum lochia  Ext: warm, well perfused    A/P: 26yo  immediately s/p  ebl 323mL, patient persistently tachycardic to 120-130s, otherwise patient w/out complaints. Acute blood loss tachycardia vs endometritis vs benign sinus tachycardia:    - obtain EKG  - administer 1L LR  - f/u heart rate after fluid hydration in 1hr    Amyeo Afroz Jereen, PGY-2  Dr Castelan made aware. S: RN informed me regarding elevated HRs. Pt complaining of intermittent contractions pains, otherwise, patient denies chest pain, shortness of breath, fevers, chills, nausea or vomiting. Feels well overall. Denies hx of tachycardia or cardiac workup.    O: ICU Vital Signs Last 24 Hrs  T(C): 37 (22 Mar 2023 15:06), Max: 37 (22 Mar 2023 15:06)  HR: 122 (22 Mar 2023 18:16) (79 - 151)  BP: 128/61 (22 Mar 2023 17:04) (111/56 - 150/80)  RR: 17 (22 Mar 2023 10:08) (16 - 17)  SpO2: 97% (22 Mar 2023 18:16) (89% - 100%)    O2 Parameters below as of 21 Mar 2023 22:03  Patient On (Oxygen Delivery Method): room air    Gen: NAD  Abd: soft, non-tender, non-distended, fundus firm  : normal post-partum lochia  Ext: warm, well perfused    A/P: 26yo  immediately s/p  ebl 323mL, patient persistently tachycardic to 120-130s, otherwise patient w/out complaints. Acute blood loss tachycardia vs endometritis vs benign sinus tachycardia:    - obtain EKG  - administer 1L LR  - f/u heart rate after fluid hydration in 1hr    Amyeo Afroz Jereen, PGY-2  Dr Castelan made aware. S: RN informed me regarding elevated HRs. Pt complaining of intermittent contractions pains, otherwise, patient denies chest pain, shortness of breath, fevers, chills, nausea or vomiting. Ambulating and voiding. Feels well overall. Denies hx of tachycardia or cardiac workup.    O: ICU Vital Signs Last 24 Hrs  T(C): 37 (22 Mar 2023 15:06), Max: 37 (22 Mar 2023 15:06)  HR: 122 (22 Mar 2023 18:16) (79 - 151)  BP: 128/61 (22 Mar 2023 17:04) (111/56 - 150/80)  RR: 17 (22 Mar 2023 10:08) (16 - 17)  SpO2: 97% (22 Mar 2023 18:16) (89% - 100%)    O2 Parameters below as of 21 Mar 2023 22:03  Patient On (Oxygen Delivery Method): room air    Gen: NAD  Abd: soft, non-tender, non-distended, fundus firm  : normal post-partum lochia  Ext: warm, well perfused    A/P: 28yo  immediately s/p  ebl 323mL, patient persistently tachycardic to 120-130s, otherwise patient w/out complaints. Acute blood loss tachycardia vs endometritis vs benign sinus tachycardia:    - obtain EKG  - administer 1L LR  - f/u heart rate after fluid hydration in 1hr    Amyeo Afroz Jereen, PGY-2  Dr Castelan made aware.

## 2023-03-22 NOTE — OB PROVIDER LABOR PROGRESS NOTE - NS_SUBJECTIVE/OBJECTIVE_OBGYN_ALL_OB_FT
Patient was evaluated for labor progress, cervical balloon came out.  Patient is on PO Cytotec, copying with contractions well, declines an epidural at this time   Vital Signs:  T(C): 36.5 (22 Mar 2023 10:08), Max: 36.8 (22 Mar 2023 02:00)  T(F): 97.7 (22 Mar 2023 10:08), Max: 98.24 (22 Mar 2023 02:00)  HR: 100 (22 Mar 2023 10:50) (93 - 118)  BP: 140/60 (22 Mar 2023 10:50) (111/83 - 140/60)  RR: 17 (22 Mar 2023 10:08) (16 - 17)    O2 Parameters below as of 21 Mar 2023 22:03  Patient On (Oxygen Delivery Method): room air
Patient was evaluated at bedside for cervical ballon placement  Labor induction is in progress with PO Cytotec  Vital Signs:  T(C): 36.6 (22 Mar 2023 07:40), Max: 36.8 (22 Mar 2023 02:00)  T(F): 97.9 (22 Mar 2023 07:40), Max: 98.24 (22 Mar 2023 02:00)  HR: 114 (22 Mar 2023 07:50) (95 - 118)  BP: 133/80 (22 Mar 2023 07:50) (111/83 - 139/80)  RR: 17 (22 Mar 2023 07:40) (16 - 17)    O2 Parameters below as of 21 Mar 2023 22:03  Patient On (Oxygen Delivery Method): room air
pt complaining of pressure

## 2023-03-23 ENCOUNTER — NON-APPOINTMENT (OUTPATIENT)
Age: 28
End: 2023-03-23

## 2023-03-23 VITALS
HEART RATE: 100 BPM | TEMPERATURE: 98 F | DIASTOLIC BLOOD PRESSURE: 72 MMHG | RESPIRATION RATE: 17 BRPM | SYSTOLIC BLOOD PRESSURE: 130 MMHG | OXYGEN SATURATION: 100 %

## 2023-03-23 RX ORDER — ASPIRIN/CALCIUM CARB/MAGNESIUM 324 MG
81 TABLET ORAL
Qty: 0 | Refills: 0 | DISCHARGE

## 2023-03-23 RX ORDER — ACETAMINOPHEN 500 MG
3 TABLET ORAL
Qty: 0 | Refills: 0 | DISCHARGE
Start: 2023-03-23

## 2023-03-23 RX ORDER — IBUPROFEN 200 MG
1 TABLET ORAL
Qty: 0 | Refills: 0 | DISCHARGE
Start: 2023-03-23

## 2023-03-23 RX ORDER — IBUPROFEN 200 MG
600 TABLET ORAL EVERY 6 HOURS
Refills: 0 | Status: DISCONTINUED | OUTPATIENT
Start: 2023-03-23 | End: 2023-03-23

## 2023-03-23 RX ADMIN — Medication 1 TABLET(S): at 13:01

## 2023-03-23 RX ADMIN — Medication 600 MILLIGRAM(S): at 05:59

## 2023-03-23 RX ADMIN — Medication 975 MILLIGRAM(S): at 01:05

## 2023-03-23 RX ADMIN — Medication 975 MILLIGRAM(S): at 02:00

## 2023-03-23 RX ADMIN — Medication 975 MILLIGRAM(S): at 09:32

## 2023-03-23 RX ADMIN — Medication 975 MILLIGRAM(S): at 09:55

## 2023-03-23 RX ADMIN — Medication 600 MILLIGRAM(S): at 06:33

## 2023-03-23 RX ADMIN — SODIUM CHLORIDE 3 MILLILITER(S): 9 INJECTION INTRAMUSCULAR; INTRAVENOUS; SUBCUTANEOUS at 05:53

## 2023-03-23 NOTE — PROGRESS NOTE ADULT - SUBJECTIVE AND OBJECTIVE BOX
OB Attending Progress Note:  PPD#1    S: 26yo PPD#1 s/p . Patient feels well. Pain is well controlled. She is tolerating a regular diet and passing flatus. She is voiding spontaneously. and ambulating without difficulty. She is breastfeeding. Denies CP/SOB. Denies lightheadedness/dizziness. Denies N/V/D.    O:  Vitals:  Vital Signs Last 24 Hrs  T(C): 36.5 (23 Mar 2023 10:00), Max: 37 (22 Mar 2023 15:06)  T(F): 97.7 (23 Mar 2023 10:00), Max: 98.6 (22 Mar 2023 15:06)  HR: 101 (23 Mar 2023 10:00) (60 - 151)  BP: 127/61 (23 Mar 2023 10:00) (105/54 - 150/80)  BP(mean): --  RR: 18 (23 Mar 2023 10:00) (18 - 20)  SpO2: 98% (23 Mar 2023 10:00) (89% - 100%)    Parameters below as of 23 Mar 2023 10:00  Patient On (Oxygen Delivery Method): room air        MEDICATIONS  (STANDING):  acetaminophen     Tablet .. 975 milliGRAM(s) Oral <User Schedule>  diphtheria/tetanus/pertussis (acellular) Vaccine (Adacel) 0.5 milliLiter(s) IntraMuscular once  ibuprofen  Tablet. 600 milliGRAM(s) Oral every 6 hours  influenza   Vaccine 0.5 milliLiter(s) IntraMuscular once  prenatal multivitamin 1 Tablet(s) Oral daily  sodium chloride 0.9% lock flush 3 milliLiter(s) IV Push every 8 hours      Labs:  Blood type: B Positive  Rubella IgG: RPR: Negative                          13.8   15.67<H> >-----------< 275    (  @ 22:14 )             44.1    23 @ 22:14      138  |  103  |  7   ----------------------------<  82  3.9   |  19<L>  |  0.49<L>        Ca    9.9      21 Mar 2023 22:14    TPro  7.2  /  Alb  4.2  /  TBili  0.3  /  DBili  x   /  AST  20  /  ALT  14  /  AlkPhos  140<H>  23 @ 22:14          Physical Exam:  General: NAD  CV: RR  Pulm: Breathing comfortably on RA  Abdomen: soft, non-tender, non-distended, +BS, fundus firm  Vaginal: Lochia wnl  Extremities: No erythema/edema    A/P: 26yo PPD#1 s/p .   - Pain well controlled, continue current pain regimen  - Increase ambulation, SCDs when not ambulating  - Continue regular diet  - Discharge planning today per protocol      Marshall Olguin MD
Anesthesia Post-op Note    POD#1 S/P labor epidural    Patient is doing well.  OOBAA. Tolerating clears.  Pain is tolerable.  No residual anesthetic issues or complications noted.

## 2023-03-24 ENCOUNTER — APPOINTMENT (OUTPATIENT)
Dept: ANTEPARTUM | Facility: CLINIC | Age: 28
End: 2023-03-24

## 2023-04-07 ENCOUNTER — NON-APPOINTMENT (OUTPATIENT)
Age: 28
End: 2023-04-07

## 2023-05-09 ENCOUNTER — APPOINTMENT (OUTPATIENT)
Dept: OBGYN | Facility: CLINIC | Age: 28
End: 2023-05-09
Payer: COMMERCIAL

## 2023-05-09 VITALS
WEIGHT: 237 LBS | DIASTOLIC BLOOD PRESSURE: 70 MMHG | HEIGHT: 64 IN | SYSTOLIC BLOOD PRESSURE: 122 MMHG | BODY MASS INDEX: 40.46 KG/M2

## 2023-05-09 DIAGNOSIS — O35.BXX0 MATERNAL CARE FOR OTHER (SUSPECTED) FETAL ABNORMALITY AND DAMAGE, FETAL CARDIAC ANOMALIES, NOT APPLICABLE OR UNSPECIFIED: ICD-10-CM

## 2023-05-09 DIAGNOSIS — Z3A.40 40 WEEKS GESTATION OF PREGNANCY: ICD-10-CM

## 2023-05-09 DIAGNOSIS — Z3A.19 19 WEEKS GESTATION OF PREGNANCY: ICD-10-CM

## 2023-05-09 DIAGNOSIS — U07.1 OTHER VIRAL DISEASES COMPLICATING PREGNANCY, THIRD TRIMESTER: ICD-10-CM

## 2023-05-09 DIAGNOSIS — Z3A.39 39 WEEKS GESTATION OF PREGNANCY: ICD-10-CM

## 2023-05-09 DIAGNOSIS — Z32.01 ENCOUNTER FOR PREGNANCY TEST, RESULT POSITIVE: ICD-10-CM

## 2023-05-09 DIAGNOSIS — U07.1 COVID-19: ICD-10-CM

## 2023-05-09 DIAGNOSIS — Z11.3 ENCOUNTER FOR SCREENING FOR INFECTIONS WITH A PREDOMINANTLY SEXUAL MODE OF TRANSMISSION: ICD-10-CM

## 2023-05-09 DIAGNOSIS — E66.01 OBESITY COMPLICATING PREGNANCY, UNSPECIFIED TRIMESTER: ICD-10-CM

## 2023-05-09 DIAGNOSIS — Z3A.32 32 WEEKS GESTATION OF PREGNANCY: ICD-10-CM

## 2023-05-09 DIAGNOSIS — Z3A.24 24 WEEKS GESTATION OF PREGNANCY: ICD-10-CM

## 2023-05-09 DIAGNOSIS — Z3A.30 30 WEEKS GESTATION OF PREGNANCY: ICD-10-CM

## 2023-05-09 DIAGNOSIS — Z3A.37 37 WEEKS GESTATION OF PREGNANCY: ICD-10-CM

## 2023-05-09 DIAGNOSIS — Z3A.34 34 WEEKS GESTATION OF PREGNANCY: ICD-10-CM

## 2023-05-09 DIAGNOSIS — N92.6 IRREGULAR MENSTRUATION, UNSPECIFIED: ICD-10-CM

## 2023-05-09 DIAGNOSIS — Z34.90 ENCOUNTER FOR SUPERVISION OF NORMAL PREGNANCY, UNSPECIFIED, UNSPECIFIED TRIMESTER: ICD-10-CM

## 2023-05-09 DIAGNOSIS — Z3A.11 11 WEEKS GESTATION OF PREGNANCY: ICD-10-CM

## 2023-05-09 DIAGNOSIS — Z3A.38 38 WEEKS GESTATION OF PREGNANCY: ICD-10-CM

## 2023-05-09 DIAGNOSIS — O99.210 OBESITY COMPLICATING PREGNANCY, UNSPECIFIED TRIMESTER: ICD-10-CM

## 2023-05-09 DIAGNOSIS — R10.2 PELVIC AND PERINEAL PAIN: ICD-10-CM

## 2023-05-09 DIAGNOSIS — Z3A.15 15 WEEKS GESTATION OF PREGNANCY: ICD-10-CM

## 2023-05-09 DIAGNOSIS — Z3A.36 36 WEEKS GESTATION OF PREGNANCY: ICD-10-CM

## 2023-05-09 DIAGNOSIS — N89.8 OTHER SPECIFIED NONINFLAMMATORY DISORDERS OF VAGINA: ICD-10-CM

## 2023-05-09 DIAGNOSIS — Z3A.28 28 WEEKS GESTATION OF PREGNANCY: ICD-10-CM

## 2023-05-09 DIAGNOSIS — O36.80X0 PREGNANCY WITH INCONCLUSIVE FETAL VIABILITY, NOT APPLICABLE OR UNSPECIFIED: ICD-10-CM

## 2023-05-09 DIAGNOSIS — O98.513 OTHER VIRAL DISEASES COMPLICATING PREGNANCY, THIRD TRIMESTER: ICD-10-CM

## 2023-05-09 PROCEDURE — 0503F POSTPARTUM CARE VISIT: CPT

## 2023-05-09 NOTE — HISTORY OF PRESENT ILLNESS
[Postpartum Follow Up] : postpartum follow up [Delivery Date: ___] : on [unfilled] [] : delivered by vaginal delivery [Female] : Delivery History: baby girl [Wt. ___] : weighing [unfilled] [Intended Contraception] : Intended Contraception: [Oral Contraceptives] : oral contraceptives [Back to Normal] : is back to normal in size [Mild] : mild vaginal bleeding [Normal] : the vagina was normal [Not Done] : Examination of breasts not done [Doing Well] : is doing well [No Sign of Infection] : is showing no signs of infection [Excellent Pain Control] : has excellent pain control [None] : None [Complications:___] : no complications [Breastfeeding] : not currently nursing [S/Sx PP Depression] : no signs/symptoms of postpartum depression [Erythema] : not erythematous [Cervix Sample Taken] : cervical sample not taken for a Pap smear [de-identified] : RTO in 3 months

## 2023-07-10 ENCOUNTER — APPOINTMENT (OUTPATIENT)
Dept: OBGYN | Facility: CLINIC | Age: 28
End: 2023-07-10
Payer: COMMERCIAL

## 2023-07-10 VITALS
WEIGHT: 243 LBS | BODY MASS INDEX: 41.48 KG/M2 | SYSTOLIC BLOOD PRESSURE: 120 MMHG | HEIGHT: 64 IN | DIASTOLIC BLOOD PRESSURE: 62 MMHG

## 2023-07-10 DIAGNOSIS — Z01.419 ENCOUNTER FOR GYNECOLOGICAL EXAMINATION (GENERAL) (ROUTINE) W/OUT ABNORMAL FINDINGS: ICD-10-CM

## 2023-07-10 PROCEDURE — 99395 PREV VISIT EST AGE 18-39: CPT

## 2023-07-10 RX ORDER — PRENATAL VIT NO.126/IRON/FOLIC 28MG-0.8MG
28-0.8 TABLET ORAL DAILY
Qty: 30 | Refills: 11 | Status: DISCONTINUED | COMMUNITY
Start: 2019-10-15 | End: 2023-07-10

## 2023-07-10 NOTE — HISTORY OF PRESENT ILLNESS
[N] : Patient reports normal menses [Oral Contraceptive] : uses oral contraception pills [Y] : Patient is sexually active [Monogamous (Male Partner)] : is monogamous with a male partner [Patient refuses STI testing] : Patient refuses STI testing [FreeTextEntry1] : 27 yo presents for annual exam and pap smear.  No complaints at this time.  Desires refill of OCP. [PapSmeardate] : 6/22/21 [LMPDate] : 6/21/23

## 2023-07-10 NOTE — PHYSICAL EXAM
[Appropriately responsive] : appropriately responsive [Alert] : alert [No Acute Distress] : no acute distress [Soft] : soft [Non-tender] : non-tender [Examination Of The Breasts] : a normal appearance [No Masses] : no breast masses were palpable [Labia Majora] : normal [Labia Minora] : normal [Normal] : normal [Uterine Adnexae] : non-palpable

## 2023-07-11 ENCOUNTER — NON-APPOINTMENT (OUTPATIENT)
Age: 28
End: 2023-07-11

## 2023-07-13 LAB — CYTOLOGY CVX/VAG DOC THIN PREP: NORMAL

## 2023-08-14 NOTE — ED PROVIDER NOTE - PROGRESS NOTE DETAILS
Called for OBGYN consult - Pt does not need formal ED consult - no acute interventions as per OBGYN. Case discussed with OBGYN attending - pt ok for dc. Advised to f.u with OBGYN out pt at scheduled appointment.
I personally performed the service described in the documentation recorded by the scribe in my presence, and it accurately and completely records my words and actions.

## 2023-12-19 ENCOUNTER — APPOINTMENT (OUTPATIENT)
Dept: OBGYN | Facility: CLINIC | Age: 28
End: 2023-12-19
Payer: COMMERCIAL

## 2023-12-19 VITALS
SYSTOLIC BLOOD PRESSURE: 124 MMHG | WEIGHT: 246 LBS | DIASTOLIC BLOOD PRESSURE: 70 MMHG | BODY MASS INDEX: 42 KG/M2 | HEIGHT: 64 IN

## 2023-12-19 DIAGNOSIS — Z30.9 ENCOUNTER FOR CONTRACEPTIVE MANAGEMENT, UNSPECIFIED: ICD-10-CM

## 2023-12-19 PROCEDURE — 99213 OFFICE O/P EST LOW 20 MIN: CPT

## 2023-12-19 RX ORDER — NORETHINDRONE 0.35 MG/1
0.35 TABLET ORAL
Qty: 3 | Refills: 1 | Status: ACTIVE | COMMUNITY
Start: 2023-05-09 | End: 1900-01-01

## 2023-12-19 NOTE — HISTORY OF PRESENT ILLNESS
[FreeTextEntry1] : 29 yo presents for birth control refills.  No complaints at this time. [Patient reported PAP Smear was normal] : Patient reported PAP Smear was normal [PapSmeardate] : 7/10/23 [LMPDate] : 11/29/23 [Patient refuses STI testing] : Patient refuses STI testing

## 2024-01-12 NOTE — ED ADULT NURSE NOTE - ISOLATION TYPE:
None
GOAL: Patient will ambulate 50ft with supervision with least restrictive assistive device in 2 weeks.

## 2024-07-16 ENCOUNTER — APPOINTMENT (OUTPATIENT)
Dept: OBGYN | Facility: CLINIC | Age: 29
End: 2024-07-16
Payer: COMMERCIAL

## 2024-07-16 VITALS
HEIGHT: 64 IN | WEIGHT: 253 LBS | DIASTOLIC BLOOD PRESSURE: 62 MMHG | BODY MASS INDEX: 43.19 KG/M2 | SYSTOLIC BLOOD PRESSURE: 124 MMHG

## 2024-07-16 DIAGNOSIS — Z30.9 ENCOUNTER FOR CONTRACEPTIVE MANAGEMENT, UNSPECIFIED: ICD-10-CM

## 2024-07-16 DIAGNOSIS — Z01.419 ENCOUNTER FOR GYNECOLOGICAL EXAMINATION (GENERAL) (ROUTINE) W/OUT ABNORMAL FINDINGS: ICD-10-CM

## 2024-07-16 PROCEDURE — 99459 PELVIC EXAMINATION: CPT

## 2024-07-16 PROCEDURE — 99395 PREV VISIT EST AGE 18-39: CPT

## 2024-07-23 LAB — CYTOLOGY CVX/VAG DOC THIN PREP: NORMAL

## 2025-02-21 ENCOUNTER — APPOINTMENT (OUTPATIENT)
Dept: OBGYN | Facility: CLINIC | Age: 30
End: 2025-02-21
Payer: COMMERCIAL

## 2025-02-21 VITALS
DIASTOLIC BLOOD PRESSURE: 70 MMHG | BODY MASS INDEX: 45.75 KG/M2 | WEIGHT: 268 LBS | HEIGHT: 64 IN | SYSTOLIC BLOOD PRESSURE: 130 MMHG

## 2025-02-21 DIAGNOSIS — Z30.9 ENCOUNTER FOR CONTRACEPTIVE MANAGEMENT, UNSPECIFIED: ICD-10-CM

## 2025-02-21 PROCEDURE — 99213 OFFICE O/P EST LOW 20 MIN: CPT

## 2025-07-07 NOTE — ED PROVIDER NOTE - PATIENT PORTAL LINK FT
attempted to contacted patient to review SBIRT results, and confirm current use of outpatient program for alcohol, drug or mental health if needed.  proceeded to leave guided message instruction patient to return call.     Patient returned MANNY call. Patient confirmed follow up appointment completion with PCP for mental health. Patient plans to continue treatment.    You can access the FollowMyHealth Patient Portal offered by [facility] by registering at the following website: [email or web address]. By joining [employer]’s FollowMyHealth portal, you will also be able to view your health information using other applications (apps) compatible with our system.

## 2025-08-19 ENCOUNTER — NON-APPOINTMENT (OUTPATIENT)
Age: 30
End: 2025-08-19

## 2025-08-19 ENCOUNTER — APPOINTMENT (OUTPATIENT)
Dept: OBGYN | Facility: CLINIC | Age: 30
End: 2025-08-19

## 2025-08-19 VITALS
BODY MASS INDEX: 37.56 KG/M2 | HEIGHT: 64 IN | WEIGHT: 220 LBS | HEART RATE: 86 BPM | DIASTOLIC BLOOD PRESSURE: 80 MMHG | SYSTOLIC BLOOD PRESSURE: 125 MMHG

## 2025-08-19 DIAGNOSIS — Z01.419 ENCOUNTER FOR GYNECOLOGICAL EXAMINATION (GENERAL) (ROUTINE) W/OUT ABNORMAL FINDINGS: ICD-10-CM

## 2025-08-19 DIAGNOSIS — Z30.9 ENCOUNTER FOR CONTRACEPTIVE MANAGEMENT, UNSPECIFIED: ICD-10-CM

## 2025-08-19 PROCEDURE — 99395 PREV VISIT EST AGE 18-39: CPT

## 2025-08-20 LAB — HPV HIGH+LOW RISK DNA PNL CVX: NOT DETECTED

## 2025-08-23 LAB — CYTOLOGY CVX/VAG DOC THIN PREP: NORMAL
